# Patient Record
Sex: MALE | Race: WHITE | Employment: OTHER | URBAN - METROPOLITAN AREA
[De-identification: names, ages, dates, MRNs, and addresses within clinical notes are randomized per-mention and may not be internally consistent; named-entity substitution may affect disease eponyms.]

---

## 2018-03-20 ENCOUNTER — HOSPITAL ENCOUNTER (OUTPATIENT)
Dept: CT IMAGING | Age: 78
Discharge: HOME | End: 2018-03-20
Attending: UROLOGY
Payer: MEDICARE

## 2018-03-20 ENCOUNTER — HOSPITAL ENCOUNTER (OUTPATIENT)
Dept: NUCLEAR MEDICINE | Age: 78
Discharge: HOME | End: 2018-03-20
Attending: UROLOGY
Payer: MEDICARE

## 2018-03-20 DIAGNOSIS — C61 PROSTATE CANCER (HCC): ICD-10-CM

## 2018-03-20 PROCEDURE — 74011636320 HC RX REV CODE- 636/320: Performed by: UROLOGY

## 2018-03-20 PROCEDURE — 74177 CT ABD & PELVIS W/CONTRAST: CPT

## 2018-03-20 PROCEDURE — 78306 BONE IMAGING WHOLE BODY: CPT

## 2018-03-20 PROCEDURE — 74011000258 HC RX REV CODE- 258: Performed by: UROLOGY

## 2018-03-20 PROCEDURE — 71260 CT THORAX DX C+: CPT

## 2018-03-20 RX ORDER — SODIUM CHLORIDE 0.9 % (FLUSH) 0.9 %
10 SYRINGE (ML) INJECTION
Status: COMPLETED | OUTPATIENT
Start: 2018-03-20 | End: 2018-03-20

## 2018-03-20 RX ADMIN — SODIUM CHLORIDE 100 ML: 900 INJECTION, SOLUTION INTRAVENOUS at 12:24

## 2018-03-20 RX ADMIN — IOHEXOL 50 ML: 240 INJECTION, SOLUTION INTRATHECAL; INTRAVASCULAR; INTRAVENOUS; ORAL at 12:24

## 2018-03-20 RX ADMIN — IOPAMIDOL 100 ML: 755 INJECTION, SOLUTION INTRAVENOUS at 12:24

## 2018-03-20 RX ADMIN — Medication 10 ML: at 12:24

## 2018-10-11 ENCOUNTER — OFFICE VISIT (OUTPATIENT)
Dept: INTERNAL MEDICINE CLINIC | Age: 78
End: 2018-10-11

## 2018-10-11 ENCOUNTER — HOSPITAL ENCOUNTER (OUTPATIENT)
Dept: LAB | Age: 78
Discharge: HOME | End: 2018-10-11
Payer: MEDICARE

## 2018-10-11 VITALS
DIASTOLIC BLOOD PRESSURE: 90 MMHG | TEMPERATURE: 97.8 F | RESPIRATION RATE: 18 BRPM | HEART RATE: 62 BPM | OXYGEN SATURATION: 98 % | SYSTOLIC BLOOD PRESSURE: 118 MMHG | WEIGHT: 168 LBS | BODY MASS INDEX: 26.37 KG/M2 | HEIGHT: 67 IN

## 2018-10-11 DIAGNOSIS — Z76.89 ENCOUNTER TO ESTABLISH CARE: Primary | ICD-10-CM

## 2018-10-11 DIAGNOSIS — E78.00 ELEVATED LDL CHOLESTEROL LEVEL: ICD-10-CM

## 2018-10-11 DIAGNOSIS — T38.0X5A STEROID-INDUCED HYPERGLYCEMIA: ICD-10-CM

## 2018-10-11 DIAGNOSIS — C79.51 PROSTATE CANCER METASTATIC TO BONE (HCC): ICD-10-CM

## 2018-10-11 DIAGNOSIS — C61 PROSTATE CANCER METASTATIC TO BONE (HCC): ICD-10-CM

## 2018-10-11 DIAGNOSIS — R73.9 STEROID-INDUCED HYPERGLYCEMIA: ICD-10-CM

## 2018-10-11 PROCEDURE — 80061 LIPID PANEL: CPT

## 2018-10-11 PROCEDURE — 36415 COLL VENOUS BLD VENIPUNCTURE: CPT

## 2018-10-11 PROCEDURE — 83036 HEMOGLOBIN GLYCOSYLATED A1C: CPT

## 2018-10-11 RX ORDER — ABIRATERONE ACETATE 500 MG/1
TABLET, FILM COATED ORAL
Refills: 11 | COMMUNITY
Start: 2018-09-19 | End: 2020-08-10 | Stop reason: ALTCHOICE

## 2018-10-11 RX ORDER — PREDNISONE 5 MG/1
TABLET ORAL
Refills: 11 | COMMUNITY
Start: 2018-09-11 | End: 2020-08-10 | Stop reason: ALTCHOICE

## 2018-10-11 NOTE — PROGRESS NOTES
New Patient Evaluation Erica Mahoney is a 78 y.o. male. They are here to establish care with the group and me as a primary care provider. He has not seen a primary care physician in some years until very recently. He has recently had a diagnosis of metastatic prostate CA. He was noted in late Dec to have a PSA of 9 by his previous PCP. He was referred to [facility] (Dr. Ferny Groves) and initially thought to have a prostate infection. He was given antibiotics for a time and then returned for follow up. He then had an elevation of his PSA further to nearly 14. A biopsy proved prostate CA. He was asked to see oncology and did so at [facility]. He was seen by Dr. Serge Walden who eventually diagnosed him with extensive bony metastasis. He was placed on a steroid and Zytiga. The PSA eventually improved. He later noted elevated blood sugars. He was told to come to see a PCP after this was discovered. He has no previous history of diabetes. He has had a colonoscopy 3 years ago. No polyps were noted. He does not remember the name of the doctor. He thinks this was at [facility]. He has had two pneumonia shots, through his past PCP Due for a flu shot Due for shingles shot He has not had an eye doctor recently. He is due an eye exam.  He will go to his wife's doctor. Patient Active Problem List  
 Diagnosis Date Noted  Steroid-induced hyperglycemia 10/11/2018  Elevated LDL cholesterol level 10/11/2018  Prostate cancer metastatic to bone ([facility] [address] 10/11/2018 Current Outpatient Prescriptions Medication Sig Dispense Refill  ZYTIGA 500 mg tab TAKE TWO TABLETS BY MOUTH EVERY DAY FOR 30 DAYS, TAKE ON AN EMPTY STOMACH 1 HOUR BEFORE OR 2 HOURS AFTER EATING. 11  
 predniSONE (DELTASONE) 5 mg tablet TAKE 1 TABLET BY MOUTH DAILY WITH ZYTIGA AS DIRECTED  11  
 leuprolide acetate (LUPRON DEPOT, 3 MONTH, IM) by IntraMUSCular route. No Known Allergies No past medical history on file. History reviewed. No pertinent surgical history. Family History Problem Relation Age of Onset  Breast Cancer Mother  Hypertension Mother  Pulmonary Fibrosis Mother  Other Father   
  charcomavie tooth  Heart Attack Father  Breast Cancer Sister Social History Substance Use Topics  Smoking status: Never Smoker  Smokeless tobacco: Never Used  Alcohol use 3.0 - 4.2 oz/week 5 - 7 Glasses of wine per week Health Maintenance Topic Date Due  
 DTaP/Tdap/Td series (1 - Tdap) 11/07/1963  Shingrix Vaccine Age 50> (1 of 2) 11/07/1992  GLAUCOMA SCREENING Q2Y  11/07/2007  Pneumococcal 65+ High/Highest Risk (1 of 2 - PCV13) 11/07/2007  MEDICARE YEARLY EXAM  03/16/2018  Influenza Age 5 to Adult  08/01/2018 Review of Systems Constitutional: Negative. Respiratory: Negative. Cardiovascular: Negative. Gastrointestinal: Negative. Visit Vitals  /90 (BP 1 Location: Right arm, BP Patient Position: Sitting)  Pulse 62  Temp 97.8 °F (36.6 °C) (Oral)  Resp 18  Ht 5' 7.05\" (1.703 m)  Wt 168 lb (76.2 kg)  SpO2 98%  BMI 26.28 kg/m2 Physical Exam  
Constitutional: No distress. Cardiovascular: Normal rate and regular rhythm. Pulmonary/Chest: Effort normal and breath sounds normal.  
 
 
 
 
ASSESSMENT/PLAN Diagnoses and all orders for this visit: 1. Encounter to establish care 2. Elevated LDL cholesterol level -he has an elevated lipid test from a previous test done in 2014 
-     LIPID PANEL 3. Steroid-induced hyperglycemia -discussed with the patient, we consider medication if necessary. Per oncology, it is imperative that he remain on steroids. 
-     HEMOGLOBIN A1C WITH EAG 4. Prostate cancer metastatic to bone [facility]) -per oncology Follow-up Disposition: 
Return in about 1 month (around 11/11/2018) for Medicare Wellness Visit- 30 minute appointment. -Discussed with the patient to continue the current plan of care. We will obtain baseline labwork and determine if any adjustments need to be done. We will also await the records of the previous PCP to ascertain further details of the patient's history. The patient agrees with and understands the plan of care. All questions have been answered.

## 2018-10-11 NOTE — PATIENT INSTRUCTIONS
Learning About Steroids and High Blood Sugar What are steroids? Steroid medicines (corticosteroids) are often prescribed by doctors to treat many conditions. They help calm down the body's response to inflammation. You may take them for asthma, COPD, back pain, or allergic reactions. They are also used for other conditions such as autoimmune diseases, arthritis, and certain types of cancer. These medicines can be given as pills or injections. The steroids discussed here are not the type of steroids used for body building. What is high blood sugar? Your body turns the food you eat into glucose (sugar), which it uses for energy. That's a good thing. But if your body isn't able to use the sugar right away, it can build up in your blood and cause problems. When the blood sugar rises to a certain level, it's called high blood sugar. This is also known as hyperglycemia. What effect can steroids have on blood sugar? Steroid medicine has many benefits. But one side effect of steroids is that they can raise your blood sugar level while you take them. In most cases, this is temporary. If you already have diabetes, you may notice that your blood sugars jump higher after you take steroids. Very rarely, taking steroids may lead to a new diagnosis of diabetes. What are the symptoms of high blood sugar? The most common symptoms of high blood sugar include: · Thirst. 
· Frequent urination. · Weight loss. · Blurry vision. How is high blood sugar caused by steroids treated? If your doctor thinks your blood sugar might be too high, you will have a blood test. If your blood sugar is at a harmful level, you may need to take medicine that lowers your blood sugar. After you are finished taking steroids, your blood sugar should go back to its usual level. At that point, you won't need the medicine any longer.  
If you are taking medicine for another condition, your doctor may make changes to how you take that medicine. Follow-up care is a key part of your treatment and safety. Be sure to make and go to all appointments, and call your doctor if you are having problems. It's also a good idea to know your test results and keep a list of the medicines you take. Where can you learn more? Go to http://allen-sergey.info/. Enter K280 in the search box to learn more about \"Learning About Steroids and High Blood Sugar. \" Current as of: March 15, 2018 Content Version: 11.8 © 1670-3980 [email or web address]. Care instructions adapted under license by [employer] (which disclaims liability or warranty for this information). If you have questions about a medical condition or this instruction, always ask your healthcare professional. [address] any warranty or liability for your use of this information. Learning About Diabetes Food Guidelines Your Care Instructions Meal planning is important to manage diabetes. It helps keep your blood sugar at a target level (which you set with your doctor). You don't have to eat special foods. You can eat what your family eats, including sweets once in a while. But you do have to pay attention to how often you eat and how much you eat of certain foods. You may want to work with a dietitian or a certified diabetes educator (CDE) to help you plan meals and snacks. A dietitian or CDE can also help you lose weight if that is one of your goals. What should you know about eating carbs? Managing the amount of carbohydrate (carbs) you eat is an important part of healthy meals when you have diabetes. Carbohydrate is found in many foods. · Learn which foods have carbs. And learn the amounts of carbs in different foods. ¨ Bread, cereal, pasta, and rice have about 15 grams of carbs in a serving. A serving is 1 slice of bread (1 ounce), ½ cup of cooked cereal, or 1/3 cup of cooked pasta or rice. ¨ Fruits have 15 grams of carbs in a serving. A serving is 1 small fresh fruit, such as an apple or orange; ½ of a banana; ½ cup of cooked or canned fruit; ½ cup of fruit juice; 1 cup of melon or raspberries; or 2 tablespoons of dried fruit. ¨ Milk and no-sugar-added yogurt have 15 grams of carbs in a serving. A serving is 1 cup of milk or 2/3 cup of no-sugar-added yogurt. ¨ Starchy vegetables have 15 grams of carbs in a serving. A serving is ½ cup of mashed potatoes or sweet potato; 1 cup winter squash; ½ of a small baked potato; ½ cup of cooked beans; or ½ cup cooked corn or green peas. · Learn how much carbs to eat each day and at each meal. A dietitian or CDE can teach you how to keep track of the amount of carbs you eat. This is called carbohydrate counting. · If you are not sure how to count carbohydrate grams, use the Plate Method to plan meals. It is a good, quick way to make sure that you have a balanced meal. It also helps you spread carbs throughout the day. ¨ Divide your plate by types of foods. Put non-starchy vegetables on half the plate, meat or other protein food on one-quarter of the plate, and a grain or starchy vegetable in the final quarter of the plate. To this you can add a small piece of fruit and 1 cup of milk or yogurt, depending on how many carbs you are supposed to eat at a meal. 
· Try to eat about the same amount of carbs at each meal. Do not \"save up\" your daily allowance of carbs to eat at one meal. 
· Proteins have very little or no carbs per serving. Examples of proteins are beef, chicken, turkey, fish, eggs, tofu, cheese, cottage cheese, and peanut butter. A serving size of meat is 3 ounces, which is about the size of a deck of cards. Examples of meat substitute serving sizes (equal to 1 ounce of meat) are 1/4 cup of cottage cheese, 1 egg, 1 tablespoon of peanut butter, and ½ cup of tofu. How can you eat out and still eat healthy? · Learn to estimate the serving sizes of foods that have carbohydrate. If you measure food at home, it will be easier to estimate the amount in a serving of restaurant food. · If the meal you order has too much carbohydrate (such as potatoes, corn, or baked beans), ask to have a low-carbohydrate food instead. Ask for a salad or green vegetables. · If you use insulin, check your blood sugar before and after eating out to help you plan how much to eat in the future. · If you eat more carbohydrate at a meal than you had planned, take a walk or do other exercise. This will help lower your blood sugar. What else should you know? · Limit saturated fat, such as the fat from meat and dairy products. This is a healthy choice because people who have diabetes are at higher risk of heart disease. So choose lean cuts of meat and nonfat or low-fat dairy products. Use olive or canola oil instead of butter or shortening when cooking. · Don't skip meals. Your blood sugar may drop too low if you skip meals and take insulin or certain medicines for diabetes. · Check with your doctor before you drink alcohol. Alcohol can cause your blood sugar to drop too low. Alcohol can also cause a bad reaction if you take certain diabetes medicines. Follow-up care is a key part of your treatment and safety. Be sure to make and go to all appointments, and call your doctor if you are having problems. It's also a good idea to know your test results and keep a list of the medicines you take. Where can you learn more? Go to http://allen-sergey.info/. Enter N058 in the search box to learn more about \"Learning About Diabetes Food Guidelines. \" Current as of: December 7, 2017 Content Version: 11.8 © 5221-4217 Vayusa. Care instructions adapted under license by Vyu (which disclaims liability or warranty for this information).  If you have questions about a medical condition or this instruction, always ask your healthcare professional. [place] [address] any warranty or liability for your use of this information.

## 2018-10-11 NOTE — MR AVS SNAPSHOT
[address] 
[phone] Patient: Chacha Beasley MRN: [id number] JKP:[birth year] Visit Information Date & Time Provider Department Dept. Phone Encounter #  
 10/11/2018  9:30 AM Andre Gomez MD [address] [address] Internal Medicine [phone] [id number] Follow-up Instructions Return in about 1 month (around 11/11/2018) for Medicare Wellness Visit- 30 minute appointment. Upcoming Health Maintenance Date Due DTaP/Tdap/Td series (1 - Tdap) 11/7/1963 Shingrix Vaccine Age 50> (1 of 2) 11/7/1992 GLAUCOMA SCREENING Q2Y 11/7/2007 Pneumococcal 65+ High/Highest Risk (1 of 2 - PCV13) 11/7/2007 MEDICARE YEARLY EXAM 3/16/2018 Influenza Age 5 to Adult 8/1/2018 Allergies as of 10/11/2018  Review Complete On: 10/11/2018 By: Bridger Hopkins No Known Allergies Current Immunizations  Never Reviewed No immunizations on file. Not reviewed this visit You Were Diagnosed With   
  
 Codes Comments Encounter to establish care    -  Primary ICD-10-CM: Z76.89 
ICD-9-CM: V65.8 Elevated LDL cholesterol level     ICD-10-CM: E78.00 ICD-9-CM: 272.0 Steroid-induced hyperglycemia     ICD-10-CM: R73.9, T38.0X5A 
ICD-9-CM: 790.29, E932.0 Vitals BP Pulse Temp Resp Height(growth percentile) Weight(growth percentile)  
 118/90 (BP 1 Location: Right arm, BP Patient Position: Sitting) 62 97.8 °F (36.6 °C) (Oral) 18 5' 7.05\" (1.703 m) 168 lb (76.2 kg) SpO2 BMI Smoking Status 98% 26.28 kg/m2 Never Smoker Vitals History BMI and BSA Data Body Mass Index Body Surface Area  
 26.28 kg/m 2 1.9 m 2 Preferred Pharmacy Pharmacy Name Phone [id number] [place] [address] [phone] Your Updated Medication List  
  
   
This list is accurate as of 10/11/18 10:24 AM.  Always use your most recent med list.  
  
  
  
  
 LUPRON DEPOT (3 MONTH) IM  
by IntraMUSCular route. predniSONE 5 mg tablet Commonly known as:  DELTASONE  
TAKE 1 TABLET BY MOUTH DAILY WITH ZYTIGA AS DIRECTED  
  
 ZYTIGA 500 mg Tab Generic drug:  abiraterone TAKE TWO TABLETS BY MOUTH EVERY DAY FOR 30 DAYS, TAKE ON AN EMPTY STOMACH 1 HOUR BEFORE OR 2 HOURS AFTER EATING. We Performed the Following HEMOGLOBIN A1C WITH EAG [16828 CPT(R)] LIPID PANEL [51325 CPT(R)] Follow-up Instructions Return in about 1 month (around 11/11/2018) for Medicare Wellness Visit- 30 minute appointment. Patient Instructions Learning About Steroids and High Blood Sugar What are steroids? Steroid medicines (corticosteroids) are often prescribed by doctors to treat many conditions. They help calm down the body's response to inflammation. You may take them for asthma, COPD, back pain, or allergic reactions. They are also used for other conditions such as autoimmune diseases, arthritis, and certain types of cancer. These medicines can be given as pills or injections. The steroids discussed here are not the type of steroids used for body building. What is high blood sugar? Your body turns the food you eat into glucose (sugar), which it uses for energy. That's a good thing. But if your body isn't able to use the sugar right away, it can build up in your blood and cause problems. When the blood sugar rises to a certain level, it's called high blood sugar. This is also known as hyperglycemia. What effect can steroids have on blood sugar? Steroid medicine has many benefits. But one side effect of steroids is that they can raise your blood sugar level while you take them. In most cases, this is temporary. If you already have diabetes, you may notice that your blood sugars jump higher after you take steroids. Very rarely, taking steroids may lead to a new diagnosis of diabetes. What are the symptoms of high blood sugar? The most common symptoms of high blood sugar include: · Thirst. 
· Frequent urination. · Weight loss. · Blurry vision. How is high blood sugar caused by steroids treated? If your doctor thinks your blood sugar might be too high, you will have a blood test. If your blood sugar is at a harmful level, you may need to take medicine that lowers your blood sugar. After you are finished taking steroids, your blood sugar should go back to its usual level. At that point, you won't need the medicine any longer. If you are taking medicine for another condition, your doctor may make changes to how you take that medicine. Follow-up care is a key part of your treatment and safety. Be sure to make and go to all appointments, and call your doctor if you are having problems. It's also a good idea to know your test results and keep a list of the medicines you take. Where can you learn more? Go to http://allen-sergey.info/. Enter K280 in the search box to learn more about \"Learning About Steroids and High Blood Sugar. \" Current as of: March 15, 2018 Content Version: 11.8 © 9752-1806 DB Networks. Care instructions adapted under license by [employer] (which disclaims liability or warranty for this information). If you have questions about a medical condition or this instruction, always ask your healthcare professional. [address] any warranty or liability for your use of this information. Learning About Diabetes Food Guidelines Your Care Instructions Meal planning is important to manage diabetes. It helps keep your blood sugar at a target level (which you set with your doctor). You don't have to eat special foods. You can eat what your family eats, including sweets once in a while. But you do have to pay attention to how often you eat and how much you eat of certain foods. You may want to work with a dietitian or a certified diabetes educator (CDE) to help you plan meals and snacks. A dietitian or CDE can also help you lose weight if that is one of your goals. What should you know about eating carbs? Managing the amount of carbohydrate (carbs) you eat is an important part of healthy meals when you have diabetes. Carbohydrate is found in many foods. · Learn which foods have carbs. And learn the amounts of carbs in different foods. ¨ Bread, cereal, pasta, and rice have about 15 grams of carbs in a serving. A serving is 1 slice of bread (1 ounce), ½ cup of cooked cereal, or 1/3 cup of cooked pasta or rice. ¨ Fruits have 15 grams of carbs in a serving. A serving is 1 small fresh fruit, such as an apple or orange; ½ of a banana; ½ cup of cooked or canned fruit; ½ cup of fruit juice; 1 cup of melon or raspberries; or 2 tablespoons of dried fruit. ¨ Milk and no-sugar-added yogurt have 15 grams of carbs in a serving. A serving is 1 cup of milk or 2/3 cup of no-sugar-added yogurt. ¨ Starchy vegetables have 15 grams of carbs in a serving. A serving is ½ cup of mashed potatoes or sweet potato; 1 cup winter squash; ½ of a small baked potato; ½ cup of cooked beans; or ½ cup cooked corn or green peas. · Learn how much carbs to eat each day and at each meal. A dietitian or CDE can teach you how to keep track of the amount of carbs you eat. This is called carbohydrate counting. · If you are not sure how to count carbohydrate grams, use the Plate Method to plan meals. It is a good, quick way to make sure that you have a balanced meal. It also helps you spread carbs throughout the day. ¨ Divide your plate by types of foods. Put non-starchy vegetables on half the plate, meat or other protein food on one-quarter of the plate, and a grain or starchy vegetable in the final quarter of the plate.  To this you can add a small piece of fruit and 1 cup of milk or yogurt, depending on how many carbs you are supposed to eat at a meal. 
· Try to eat about the same amount of carbs at each meal. Do not \"save up\" your daily allowance of carbs to eat at one meal. 
· Proteins have very little or no carbs per serving. Examples of proteins are beef, chicken, turkey, fish, eggs, tofu, cheese, cottage cheese, and peanut butter. A serving size of meat is 3 ounces, which is about the size of a deck of cards. Examples of meat substitute serving sizes (equal to 1 ounce of meat) are 1/4 cup of cottage cheese, 1 egg, 1 tablespoon of peanut butter, and ½ cup of tofu. How can you eat out and still eat healthy? · Learn to estimate the serving sizes of foods that have carbohydrate. If you measure food at home, it will be easier to estimate the amount in a serving of restaurant food. · If the meal you order has too much carbohydrate (such as potatoes, corn, or baked beans), ask to have a low-carbohydrate food instead. Ask for a salad or green vegetables. · If you use insulin, check your blood sugar before and after eating out to help you plan how much to eat in the future. · If you eat more carbohydrate at a meal than you had planned, take a walk or do other exercise. This will help lower your blood sugar. What else should you know? · Limit saturated fat, such as the fat from meat and dairy products. This is a healthy choice because people who have diabetes are at higher risk of heart disease. So choose lean cuts of meat and nonfat or low-fat dairy products. Use olive or canola oil instead of butter or shortening when cooking. · Don't skip meals. Your blood sugar may drop too low if you skip meals and take insulin or certain medicines for diabetes. · Check with your doctor before you drink alcohol. Alcohol can cause your blood sugar to drop too low. Alcohol can also cause a bad reaction if you take certain diabetes medicines. Follow-up care is a key part of your treatment and safety.  Be sure to make and go to all appointments, and call your doctor if you are having problems. It's also a good idea to know your test results and keep a list of the medicines you take. Where can you learn more? Go to http://allen-sergey.info/. Enter O377 in the search box to learn more about \"Learning About Diabetes Food Guidelines. \" Current as of: December 7, 2017 Content Version: 11.8 © 0316-1127 iChange. Care instructions adapted under license by [employer] (which disclaims liability or warranty for this information). If you have questions about a medical condition or this instruction, always ask your healthcare professional. [address] any warranty or liability for your use of this information. Introducing [facility]! [employer] introduces [facility] patient portal. Now you can access parts of your medical record, email your doctor's office, and request medication refills online. 1. In your internet browser, go to [email or web address] 2. Click on the First Time User? Click Here link in the Sign In box. You will see the New Member Sign Up page. 3. Enter your IMVU Access Code exactly as it appears below. You will not need to use this code after youve completed the sign-up process. If you do not sign up before the expiration date, you must request a new code. · IMVU Access Code: [email or web address] Expires: 1/9/2019 10:24 AM 
 
4. Enter the last four digits of your Social Security Number (xxxx) and Date of Birth (mm/dd/yyyy) as indicated and click Submit. You will be taken to the next sign-up page. 5. Create a Nutraspacet ID. This will be your IMVU login ID and cannot be changed, so think of one that is secure and easy to remember. 6. Create a IMVU password. You can change your password at any time. 7. Enter your Password Reset Question and Answer.  This can be used at a later time if you forget your password. 8. Enter your e-mail address. You will receive e-mail notification when new information is available in [address] 9. Click Sign Up. You can now view and download portions of your medical record. 10. Click the Download Summary menu link to download a portable copy of your medical information. If you have questions, please visit the Frequently Asked Questions section of the [facility] website. Remember, [facility] is NOT to be used for urgent needs. For medical emergencies, dial 911. Now available from your iPhone and Android! Please provide this summary of care documentation to your next provider. Your primary care clinician is listed as Emily Hogue. If you have any questions after today's visit, please call [phone].

## 2018-10-12 LAB
CHOLEST SERPL-MCNC: 178 MG/DL (ref 100–199)
EST. AVERAGE GLUCOSE BLD GHB EST-MCNC: 171 MG/DL
HBA1C MFR BLD: 7.6 % (ref 4.8–5.6)
HDLC SERPL-MCNC: 39 MG/DL
LDLC SERPL CALC-MCNC: 112 MG/DL (ref 0–99)
TRIGL SERPL-MCNC: 135 MG/DL (ref 0–149)
VLDLC SERPL CALC-MCNC: 27 MG/DL (ref 5–40)

## 2018-11-12 ENCOUNTER — OFFICE VISIT (OUTPATIENT)
Dept: INTERNAL MEDICINE CLINIC | Age: 78
End: 2018-11-12

## 2018-11-12 VITALS
OXYGEN SATURATION: 97 % | SYSTOLIC BLOOD PRESSURE: 110 MMHG | BODY MASS INDEX: 26.24 KG/M2 | RESPIRATION RATE: 17 BRPM | WEIGHT: 167.2 LBS | TEMPERATURE: 98.4 F | HEIGHT: 67 IN | DIASTOLIC BLOOD PRESSURE: 81 MMHG | HEART RATE: 67 BPM

## 2018-11-12 DIAGNOSIS — Z13.5 SCREENING FOR GLAUCOMA: ICD-10-CM

## 2018-11-12 DIAGNOSIS — Z00.00 MEDICARE ANNUAL WELLNESS VISIT, SUBSEQUENT: Primary | ICD-10-CM

## 2018-11-12 DIAGNOSIS — E11.65 TYPE 2 DIABETES MELLITUS WITH HYPERGLYCEMIA, WITHOUT LONG-TERM CURRENT USE OF INSULIN (HCC): ICD-10-CM

## 2018-11-12 DIAGNOSIS — Z23 ENCOUNTER FOR IMMUNIZATION: ICD-10-CM

## 2018-11-12 RX ORDER — METFORMIN HYDROCHLORIDE 500 MG/1
500 TABLET ORAL 2 TIMES DAILY WITH MEALS
Qty: 60 TAB | Refills: 3 | Status: SHIPPED | OUTPATIENT
Start: 2018-11-12 | End: 2019-03-09 | Stop reason: SDUPTHER

## 2018-11-12 NOTE — PATIENT INSTRUCTIONS
Learning About Diabetes Food Guidelines  Your Care Instructions    Meal planning is important to manage diabetes. It helps keep your blood sugar at a target level (which you set with your doctor). You don't have to eat special foods. You can eat what your family eats, including sweets once in a while. But you do have to pay attention to how often you eat and how much you eat of certain foods. You may want to work with a dietitian or a certified diabetes educator (CDE) to help you plan meals and snacks. A dietitian or CDE can also help you lose weight if that is one of your goals. What should you know about eating carbs? Managing the amount of carbohydrate (carbs) you eat is an important part of healthy meals when you have diabetes. Carbohydrate is found in many foods. · Learn which foods have carbs. And learn the amounts of carbs in different foods. ? Bread, cereal, pasta, and rice have about 15 grams of carbs in a serving. A serving is 1 slice of bread (1 ounce), ½ cup of cooked cereal, or 1/3 cup of cooked pasta or rice. ? Fruits have 15 grams of carbs in a serving. A serving is 1 small fresh fruit, such as an apple or orange; ½ of a banana; ½ cup of cooked or canned fruit; ½ cup of fruit juice; 1 cup of melon or raspberries; or 2 tablespoons of dried fruit. ? Milk and no-sugar-added yogurt have 15 grams of carbs in a serving. A serving is 1 cup of milk or 2/3 cup of no-sugar-added yogurt. ? Starchy vegetables have 15 grams of carbs in a serving. A serving is ½ cup of mashed potatoes or sweet potato; 1 cup winter squash; ½ of a small baked potato; ½ cup of cooked beans; or ½ cup cooked corn or green peas. · Learn how much carbs to eat each day and at each meal. A dietitian or CDE can teach you how to keep track of the amount of carbs you eat. This is called carbohydrate counting. · If you are not sure how to count carbohydrate grams, use the Plate Method to plan meals.  It is a good, quick way to make sure that you have a balanced meal. It also helps you spread carbs throughout the day. ? Divide your plate by types of foods. Put non-starchy vegetables on half the plate, meat or other protein food on one-quarter of the plate, and a grain or starchy vegetable in the final quarter of the plate. To this you can add a small piece of fruit and 1 cup of milk or yogurt, depending on how many carbs you are supposed to eat at a meal.  · Try to eat about the same amount of carbs at each meal. Do not \"save up\" your daily allowance of carbs to eat at one meal.  · Proteins have very little or no carbs per serving. Examples of proteins are beef, chicken, turkey, fish, eggs, tofu, cheese, cottage cheese, and peanut butter. A serving size of meat is 3 ounces, which is about the size of a deck of cards. Examples of meat substitute serving sizes (equal to 1 ounce of meat) are 1/4 cup of cottage cheese, 1 egg, 1 tablespoon of peanut butter, and ½ cup of tofu. How can you eat out and still eat healthy? · Learn to estimate the serving sizes of foods that have carbohydrate. If you measure food at home, it will be easier to estimate the amount in a serving of restaurant food. · If the meal you order has too much carbohydrate (such as potatoes, corn, or baked beans), ask to have a low-carbohydrate food instead. Ask for a salad or green vegetables. · If you use insulin, check your blood sugar before and after eating out to help you plan how much to eat in the future. · If you eat more carbohydrate at a meal than you had planned, take a walk or do other exercise. This will help lower your blood sugar. What else should you know? · Limit saturated fat, such as the fat from meat and dairy products. This is a healthy choice because people who have diabetes are at higher risk of heart disease. So choose lean cuts of meat and nonfat or low-fat dairy products.  Use olive or canola oil instead of butter or shortening when cooking. · Don't skip meals. Your blood sugar may drop too low if you skip meals and take insulin or certain medicines for diabetes. · Check with your doctor before you drink alcohol. Alcohol can cause your blood sugar to drop too low. Alcohol can also cause a bad reaction if you take certain diabetes medicines. Follow-up care is a key part of your treatment and safety. Be sure to make and go to all appointments, and call your doctor if you are having problems. It's also a good idea to know your test results and keep a list of the medicines you take. Where can you learn more? Go to http://allen-sergey.info/. Enter I998 in the search box to learn more about \"Learning About Diabetes Food Guidelines. \"  Current as of: December 7, 2017  Content Version: 11.8  © 7559-5573 [employer]. Care instructions adapted under license by [employer] (which disclaims liability or warranty for this information). If you have questions about a medical condition or this instruction, always ask your healthcare professional. [address] any warranty or liability for your use of this information. Learning About Meal Planning for Diabetes  Why plan your meals? Meal planning can be a key part of managing diabetes. Planning meals and snacks with the right balance of carbohydrate, protein, and fat can help you keep your blood sugar at the target level you set with your doctor. You don't have to eat special foods. You can eat what your family eats, including sweets once in a while. But you do have to pay attention to how often you eat and how much you eat of certain foods. You may want to work with a dietitian or a certified diabetes educator. He or she can give you tips and meal ideas and can answer your questions about meal planning. This health professional can also help you reach a healthy weight if that is one of your goals. What plan is right for you?   Your dietitian or diabetes educator may suggest that you start with the plate format or carbohydrate counting. The plate format  The plate format is a simple way to help you manage how you eat. You plan meals by learning how much space each food should take on a plate. Using the plate format helps you spread carbohydrate throughout the day. It can make it easier to keep your blood sugar level within your target range. It also helps you see if you're eating healthy portion sizes. To use the plate format, you put non-starchy vegetables on half your plate. Add meat or meat substitutes on one-quarter of the plate. Put a grain or starchy vegetable (such as brown rice or a potato) on the final quarter of the plate. You can add a small piece of fruit and some low-fat or fat-free milk or yogurt, depending on your carbohydrate goal for each meal.  Here are some tips for using the plate format:  · Make sure that you are not using an oversized plate. A 9-inch plate is best. Many restaurants use larger plates. · Get used to using the plate format at home. Then you can use it when you eat out. · Write down your questions about using the plate format. Talk to your doctor, a dietitian, or a diabetes educator about your concerns. Carbohydrate counting  With carbohydrate counting, you plan meals based on the amount of carbohydrate in each food. Carbohydrate raises blood sugar higher and more quickly than any other nutrient. It is found in desserts, breads and cereals, and fruit. It's also found in starchy vegetables such as potatoes and corn, grains such as rice and pasta, and milk and yogurt. Spreading carbohydrate throughout the day helps keep your blood sugar levels within your target range. Your daily amount depends on several things, including your weight, how active you are, which diabetes medicines you take, and what your goals are for your blood sugar levels.  A registered dietitian or diabetes educator can help you plan how much carbohydrate to include in each meal and snack. A guideline for your daily amount of carbohydrate is:  · 45 to 60 grams at each meal. That's about the same as 3 to 4 carbohydrate servings. · 15 to 20 grams at each snack. That's about the same as 1 carbohydrate serving. The Nutrition Facts label on packaged foods tells you how much carbohydrate is in a serving of the food. First, look at the serving size on the food label. Is that the amount you eat in a serving? All of the nutrition information on a food label is based on that serving size. So if you eat more or less than that, you'll need to adjust the other numbers. Total carbohydrate is the next thing you need to look for on the label. If you count carbohydrate servings, one serving of carbohydrate is 15 grams. For foods that don't come with labels, such as fresh fruits and vegetables, you'll need a guide that lists carbohydrate in these foods. Ask your doctor, dietitian, or diabetes educator about books or other nutrition guides you can use. If you take insulin, you need to know how many grams of carbohydrate are in a meal. This lets you know how much rapid-acting insulin to take before you eat. If you use an insulin pump, you get a constant rate of insulin during the day. So the pump must be programmed at meals to give you extra insulin to cover the rise in blood sugar after meals. When you know how much carbohydrate you will eat, you can take the right amount of insulin. Or, if you always use the same amount of insulin, you need to make sure that you eat the same amount of carbohydrate at meals. If you need more help to understand carbohydrate counting and food labels, ask your doctor, dietitian, or diabetes educator. How do you get started with meal planning? Here are some tips to get started:  · Plan your meals a week at a time. Don't forget to include snacks too. · Use cookbooks or online recipes to plan several main meals.  Plan some quick meals for busy nights. You also can double some recipes that freeze well. Then you can save half for other busy nights when you don't have time to cook. · Make sure you have the ingredients you need for your recipes. If you're running low on basic items, put these items on your shopping list too. · List foods that you use to make breakfasts, lunches, and snacks. List plenty of fruits and vegetables. · Post this list on the refrigerator. Add to it as you think of more things you need. · Take the list to the store to do your weekly shopping. Follow-up care is a key part of your treatment and safety. Be sure to make and go to all appointments, and call your doctor if you are having problems. It's also a good idea to know your test results and keep a list of the medicines you take. Where can you learn more? Go to http://allenIvan Filmed Entertainmentsergey.info/. Andrew Boone in the search box to learn more about \"Learning About Meal Planning for Diabetes. \"  Current as of: December 7, 2017  Content Version: 11.8  © 9109-1779 [employer]. Care instructions adapted under license by [employer] (which disclaims liability or warranty for this information). If you have questions about a medical condition or this instruction, always ask your healthcare professional. [address] any warranty or liability for your use of this information. Nutrition Tips for Diabetes: After Your Visit  Your Care Instructions  A healthy diet is important to manage diabetes. It helps you lose weight (if you need to) and keep it off. It gives you the nutrition and energy your body needs and helps prevent heart disease. But a diet for diabetes does not mean that you have to eat special foods. You can eat what your family eats, including occasional sweets and other favorites. But you do have to pay attention to how often you eat and how much you eat of certain foods.  The right plan for you will give you meals that help you keep your blood sugar at healthy levels. Try to eat a variety of foods and to spread carbohydrate throughout the day. Carbohydrate raises blood sugar higher and more quickly than any other nutrient does. Carbohydrate is found in sugar, breads and cereals, fruit, starchy vegetables such as potatoes and corn, and milk and yogurt. You may want to work with a dietitian or diabetes educator to help you plan meals and snacks. A dietitian or diabetes educator also can help you lose weight if that is one of your goals. The following tips can help you enjoy your meals and stay healthy. Follow-up care is a key part of your treatment and safety. Be sure to make and go to all appointments, and call your doctor if you are having problems. Its also a good idea to know your test results and keep a list of the medicines you take. How can you care for yourself at home? · Learn which foods have carbohydrate and how much carbohydrate to eat. A dietitian or diabetes educator can help you learn to keep track of how much carbohydrate you eat. · Spread carbohydrate throughout the day. Eat some carbohydrate at all meals, but do not eat too much at any one time. · Plan meals to include food from all the food groups. These are the food groups and some example portion sizes:  ¨ Grains: 1 slice of bread (1 ounce), ½ cup of cooked cereal, and 1/3 cup of cooked pasta or rice. These have about 15 grams of carbohydrate in a serving. Choose whole grains such as whole wheat bread or crackers, oatmeal, and brown rice more often than refined grains. ¨ Fruit: 1 small fresh fruit, such as an apple or orange; ½ of a banana; ½ cup of chopped, cooked, or canned fruit; ½ cup of fruit juice; 1 cup of melon or raspberries; and 2 tablespoons of dried fruit. These have about 15 grams of carbohydrate in a serving. ¨ Dairy: 1 cup of nonfat or low-fat milk and 2/3 cup of plain yogurt.  These have about 15 grams of carbohydrate in a serving. ¨ Protein foods: Beef, chicken, turkey, fish, eggs, tofu, cheese, cottage cheese, and peanut butter. A serving size of meat is 3 ounces, which is about the size of a deck of cards. Examples of meat substitute serving sizes (equal to 1 ounce of meat) are 1/4 cup of cottage cheese, 1 egg, 1 tablespoon of peanut butter, and ½ cup of tofu. These have very little or no carbohydrate per serving. ¨ Vegetables: Starchy vegetables such as ½ cup of cooked dried beans, peas, potatoes, or corn have about 15 grams of carbohydrate. Nonstarchy vegetables have very little carbohydrate, such as 1 cup of raw leafy vegetables (such as spinach), ½ cup of other vegetables (cooked or chopped), and 3/4 cup of vegetable juice. · Use the plate format to plan meals. It is a good, quick way to make sure that you have a balanced meal. It also helps you spread carbohydrate throughout the day. You divide your plate by types of foods. Put vegetables on half the plate, meat or meat substitutes on one-quarter of the plate, and a grain or starchy vegetable (such as brown rice or a potato) in the final quarter of the plate. To this you can add a small piece of fruit and 1 cup of milk or yogurt, depending on how much carbohydrate you are supposed to eat at a meal.  · Talk to your dietitian or diabetes educator about ways to add limited amounts of sweets into your meal plan. You can eat these foods now and then, as long as you include the amount of carbohydrate they have in your daily carbohydrate allowance. · If you drink alcohol, limit it to no more than 1 drink a day for women and 2 drinks a day for men. If you are pregnant, no amount of alcohol is known to be safe. · Protein, fat, and fiber do not raise blood sugar as much as carbohydrate does. If you eat a lot of these nutrients in a meal, your blood sugar will rise more slowly than it would otherwise. · Limit saturated fats, such as those from meat and dairy products.  Try to replace it with monounsaturated fat, such as olive oil. This is a healthier choice because people who have diabetes are at higher-than-average risk of heart disease. But use a modest amount of olive oil. A tablespoon of olive oil has 14 grams of fat and 120 calories. · Exercise lowers blood sugar. If you take insulin by shots or pump, you can use less than you would if you were not exercising. Keep in mind that timing matters. If you exercise within 1 hour after a meal, your body may need less insulin for that meal than it would if you exercised 3 hours after the meal. Test your blood sugar to find out how exercise affects your need for insulin. · Exercise on most days of the week. Aim for at least 30 minutes. Exercise helps you stay at a healthy weight and helps your body use insulin. Walking is an easy way to get exercise. Gradually increase the amount you walk every day. You also may want to swim, bike, or do other activities. When you eat out  · Learn to estimate the serving sizes of foods that have carbohydrate. If you measure food at home, it will be easier to estimate the amount in a serving of restaurant food. · If the meal you order has too much carbohydrate (such as potatoes, corn, or baked beans), ask to have a low-carbohydrate food instead. Ask for a salad or green vegetables. · If you use insulin, check your blood sugar before and after eating out to help you plan how much to eat in the future. · If you eat more carbohydrate at a meal than you had planned, take a walk or do other exercise. This will help lower your blood sugar. Where can you learn more? Go to [employer].be  Enter K604 in the search box to learn more about \"Nutrition Tips for Diabetes: After Your Visit. \"   © 9095-3503 [employer]. Care instructions adapted under license by [employer] (which disclaims liability or warranty for this information).  This care instruction is for use with your licensed healthcare professional. If you have questions about a medical condition or this instruction, always ask your healthcare professional. [place] [address] any warranty or liability for your use of this information.   Content Version: 77.9.999413; Current as of: June 4, 2014

## 2018-11-12 NOTE — PROGRESS NOTES
This is a Subsequent Medicare Annual Wellness Visit providing Personalized Prevention Plan Services (PPPS) (Performed 12 months after initial AWV and PPPS )    I have reviewed the patient's medical history in detail and updated the computerized patient record. Lab work was reviewed with the patient this morning. He has a noted elevation in his hemoglobin A1c to 7.6. This had been previously discussed at his initial visit that he may have diabetes by virtue of his having to be treated with steroids for his metastatic prostate cancer. He reports feeling \"well\". He has no polyuria or polydipsia. He is not excessively hungry and denies lower extremity sensory deficits. We discussed diabetes management in detail. He tells me that 2 days ago he felt bad for 2 hours. He was lethargic, low energy but that this resolved abruptly. He has not had further episodes such as this since that time. Eye appointment set for Dec. 7th    History   History reviewed. No pertinent past medical history. History reviewed. No pertinent surgical history. Current Outpatient Medications   Medication Sig Dispense Refill    diphtheria-pertussis, acellular,-tetanus (BOOSTRIX TDAP) 2.5-8-5 Lf-mcg-Lf/0.5mL susp susp 0.5 mL by IntraMUSCular route once for 1 dose. 0.5 mL 0    varicella-zoster recombinant, PF, (SHINGRIX, PF,) 50 mcg/0.5 mL susr injection 0.5 mL by IntraMUSCular route once for 1 dose. 0.5 mL 0    pneumococcal 13 blayne conj dip (PREVNAR-13) 0.5 mL syrg injection 0.5 mL by IntraMUSCular route once for 1 dose. 0.5 mL 0    ZYTIGA 500 mg tab TAKE TWO TABLETS BY MOUTH EVERY DAY FOR 30 DAYS, TAKE ON AN EMPTY STOMACH 1 HOUR BEFORE OR 2 HOURS AFTER EATING. 11    predniSONE (DELTASONE) 5 mg tablet TAKE 1 TABLET BY MOUTH DAILY WITH ZYTIGA AS DIRECTED  11    leuprolide acetate (LUPRON DEPOT, 3 MONTH, IM) by IntraMUSCular route.        No Known Allergies  Family History   Problem Relation Age of Onset   26 [facility] Breast Cancer Mother  Hypertension Mother     Pulmonary Fibrosis Mother     Other Father         charcomavie tooth    Heart Attack Father     Breast Cancer Sister      Social History     Tobacco Use    Smoking status: Never Smoker    Smokeless tobacco: Never Used   Substance Use Topics    Alcohol use: Yes     Alcohol/week: 3.0 - 4.2 oz     Types: 5 - 7 Glasses of wine per week     Patient Active Problem List   Diagnosis Code    Steroid-induced hyperglycemia R73.9, T38.0X5A    Elevated LDL cholesterol level E78.00    [facility] C61, C79.51       Depression Risk Factor Screening:     PHQ over the last two weeks 10/11/2018   Little interest or pleasure in doing things Not at all   Feeling down, depressed, irritable, or hopeless Not at all   Total Score PHQ 2 0     Alcohol Risk Factor Screening: You do not drink alcohol or very rarely. Functional Ability and Level of Safety:     Hearing Loss   Hearing is good. Activities of Daily Living   Self-care. Requires assistance with: no ADLs    Fall Risk     Fall Risk Assessment, last 12 mths 10/11/2018   Able to walk? Yes   Fall in past 12 months? No     Abuse Screen   Patient is not abused    Review of Systems   Pertinent items are noted in HPI. Physical Examination     Evaluation of Cognitive Function:  Mood/affect:  happy  Appearance: age appropriate  Family member/caregiver input: His wife corroborates the history    Visit Vitals  /81 (BP 1 Location: Right arm, BP Patient Position: Sitting)   Pulse 67   Temp 98.4 °F (36.9 °C) (Oral)   Resp 17   Ht 5' 7.05\" (1.703 m)   Wt 167 lb 3.2 oz (75.8 kg)   SpO2 97%   BMI 26.15 kg/m²     General appearance: alert, cooperative, no distress, appears stated age  Nose: Nares normal. Septum midline. Mucosa normal. No drainage or sinus tenderness.   Throat: Lips, mucosa, and tongue normal. Teeth and gums normal  Lungs: clear to auscultation bilaterally  Heart: regular rate and rhythm, S1, S2 normal, no murmur, click, rub or gallop  Skin: Skin color, texture, turgor normal. No rashes or lesions    Patient Care Team:  Ever Box MD as PCP - General (Internal Medicine)    Advice/Referrals/Counseling   Education and counseling provided:  Are appropriate based on today's review and evaluation      Assessment/Plan   Diagnoses and all orders for this visit:    1. Medicare annual wellness visit, subsequent    2. Encounter for immunization  -     diphtheria-pertussis, acellular,-tetanus (BOOSTRIX TDAP) 2.5-8-5 Lf-mcg-Lf/0.5mL susp susp; 0.5 mL by IntraMUSCular route once for 1 dose.  -     varicella-zoster recombinant, PF, (SHINGRIX, PF,) 50 mcg/0.5 mL susr injection; 0.5 mL by IntraMUSCular route once for 1 dose. 3. Screening for glaucoma    4. Type 2 diabetes mellitus with hyperglycemia, without long-term current use of insulin ([facility] [address] -discussed with the patient, we will begin metformin today. He will monitor his intake of carbohydrates and we will recheck the A1c in 3 months. The patient endorses understanding  -     metFORMIN (GLUCOPHAGE) 500 mg tablet; Take 1 Tab by mouth two (2) times daily (with meals). Follow-up Disposition:  Return in about 3 months (around 2/12/2019). Gil[place] Counter

## 2019-02-12 ENCOUNTER — HOSPITAL ENCOUNTER (OUTPATIENT)
Dept: LAB | Age: 79
Discharge: HOME | End: 2019-02-12
Payer: MEDICARE

## 2019-02-12 DIAGNOSIS — Z12.5 PROSTATE CANCER SCREENING: ICD-10-CM

## 2019-02-12 DIAGNOSIS — Z00.00 WELLNESS EXAMINATION: Primary | ICD-10-CM

## 2019-02-12 PROCEDURE — 85025 COMPLETE CBC W/AUTO DIFF WBC: CPT

## 2019-02-12 PROCEDURE — 36415 COLL VENOUS BLD VENIPUNCTURE: CPT

## 2019-02-12 PROCEDURE — 83036 HEMOGLOBIN GLYCOSYLATED A1C: CPT

## 2019-02-12 PROCEDURE — 80053 COMPREHEN METABOLIC PANEL: CPT

## 2019-02-12 PROCEDURE — 84153 ASSAY OF PSA TOTAL: CPT

## 2019-02-12 PROCEDURE — 82043 UR ALBUMIN QUANTITATIVE: CPT

## 2019-02-13 LAB
ALBUMIN SERPL-MCNC: 4.3 G/DL (ref 3.5–4.8)
ALBUMIN/CREAT UR: <3.1 MG/G CREAT (ref 0–30)
ALBUMIN/GLOB SERPL: 1.9 {RATIO} (ref 1.2–2.2)
ALP SERPL-CCNC: 88 IU/L (ref 39–117)
ALT SERPL-CCNC: 13 IU/L (ref 0–44)
AST SERPL-CCNC: 17 IU/L (ref 0–40)
BASOPHILS # BLD AUTO: 0 X10E3/UL (ref 0–0.2)
BASOPHILS NFR BLD AUTO: 0 %
BILIRUB SERPL-MCNC: 0.6 MG/DL (ref 0–1.2)
BUN SERPL-MCNC: 14 MG/DL (ref 8–27)
BUN/CREAT SERPL: 17 (ref 10–24)
CALCIUM SERPL-MCNC: 9.8 MG/DL (ref 8.6–10.2)
CHLORIDE SERPL-SCNC: 104 MMOL/L (ref 96–106)
CO2 SERPL-SCNC: 24 MMOL/L (ref 20–29)
CREAT SERPL-MCNC: 0.84 MG/DL (ref 0.76–1.27)
CREAT UR-MCNC: 96.7 MG/DL
EOSINOPHIL # BLD AUTO: 0.1 X10E3/UL (ref 0–0.4)
EOSINOPHIL NFR BLD AUTO: 2 %
ERYTHROCYTE [DISTWIDTH] IN BLOOD BY AUTOMATED COUNT: 13.6 % (ref 12.3–15.4)
EST. AVERAGE GLUCOSE BLD GHB EST-MCNC: 131 MG/DL
GLOBULIN SER CALC-MCNC: 2.3 G/DL (ref 1.5–4.5)
GLUCOSE SERPL-MCNC: 106 MG/DL (ref 65–99)
HBA1C MFR BLD: 6.2 % (ref 4.8–5.6)
HCT VFR BLD AUTO: 42.9 % (ref 37.5–51)
HGB BLD-MCNC: 13.8 G/DL (ref 13–17.7)
IMM GRANULOCYTES # BLD AUTO: 0 X10E3/UL (ref 0–0.1)
IMM GRANULOCYTES NFR BLD AUTO: 0 %
LYMPHOCYTES # BLD AUTO: 1.4 X10E3/UL (ref 0.7–3.1)
LYMPHOCYTES NFR BLD AUTO: 27 %
MCH RBC QN AUTO: 29.5 PG (ref 26.6–33)
MCHC RBC AUTO-ENTMCNC: 32.2 G/DL (ref 31.5–35.7)
MCV RBC AUTO: 92 FL (ref 79–97)
MICROALBUMIN UR-MCNC: <3 UG/ML
MONOCYTES # BLD AUTO: 0.4 X10E3/UL (ref 0.1–0.9)
MONOCYTES NFR BLD AUTO: 9 %
NEUTROPHILS # BLD AUTO: 3.1 X10E3/UL (ref 1.4–7)
NEUTROPHILS NFR BLD AUTO: 62 %
PLATELET # BLD AUTO: 181 X10E3/UL (ref 150–379)
POTASSIUM SERPL-SCNC: 4.3 MMOL/L (ref 3.5–5.2)
PROT SERPL-MCNC: 6.6 G/DL (ref 6–8.5)
PSA SERPL-MCNC: <0.1 NG/ML (ref 0–4)
RBC # BLD AUTO: 4.68 X10E6/UL (ref 4.14–5.8)
SODIUM SERPL-SCNC: 145 MMOL/L (ref 134–144)
WBC # BLD AUTO: 5.1 X10E3/UL (ref 3.4–10.8)

## 2019-02-19 ENCOUNTER — OFFICE VISIT (OUTPATIENT)
Dept: INTERNAL MEDICINE CLINIC | Age: 79
End: 2019-02-19

## 2019-02-19 VITALS
SYSTOLIC BLOOD PRESSURE: 119 MMHG | HEART RATE: 74 BPM | WEIGHT: 166.6 LBS | OXYGEN SATURATION: 97 % | BODY MASS INDEX: 26.15 KG/M2 | DIASTOLIC BLOOD PRESSURE: 80 MMHG | HEIGHT: 67 IN | RESPIRATION RATE: 18 BRPM | TEMPERATURE: 98.1 F

## 2019-02-19 DIAGNOSIS — Z23 ENCOUNTER FOR IMMUNIZATION: ICD-10-CM

## 2019-02-19 DIAGNOSIS — T38.0X5A STEROID-INDUCED HYPERGLYCEMIA: Primary | ICD-10-CM

## 2019-02-19 DIAGNOSIS — R73.9 STEROID-INDUCED HYPERGLYCEMIA: Primary | ICD-10-CM

## 2019-02-19 RX ORDER — ACETAMINOPHEN 500 MG
TABLET ORAL 2 TIMES DAILY
COMMUNITY

## 2019-02-19 RX ORDER — CALCIUM CARBONATE 600 MG
600 TABLET ORAL 2 TIMES DAILY
COMMUNITY

## 2019-02-19 NOTE — PROGRESS NOTES
Follow Up Visit    Simon Sheldon is a 70 y.o. male. he presents for Diabetes    Endocrine Review  He is seen for diabetes. Since last visit: he reports doing well on medications. Home glucose monitoring: is not performed. He reports medication compliance: compliant all of the time. He reports the following medication side effects: none. Diabetic diet compliance: compliant all of the time. Further diabetic ROS: no hypoglycemia. Lab review: labs reviewed and discussed with patient, A1c today is 6.2. He is still seeing Oncology. Following up in the coming months. 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 Authorizing Provider   calcium carbonate (CALTREX) 600 mg calcium (1,500 mg) tablet Take 600 mg by mouth two (2) times a day. Yes Provider, Historical   cholecalciferol (VITAMIN D3) 2,000 unit cap capsule Take  by mouth two (2) times a day. Yes Provider, Historical   metFORMIN (GLUCOPHAGE) 500 mg tablet Take 1 Tab by mouth two (2) times daily (with meals). 11/12/18  Yes Cricket Esparza MD   ZYTIGA 500 mg tab TAKE TWO TABLETS BY MOUTH EVERY DAY FOR 30 DAYS, TAKE ON AN EMPTY STOMACH 1 HOUR BEFORE OR 2 HOURS AFTER EATING. 9/19/18  Yes Provider, Historical   predniSONE (DELTASONE) 5 mg tablet TAKE 1 TABLET BY MOUTH DAILY WITH ZYTIGA AS DIRECTED 9/11/18  Yes Provider, Historical   leuprolide acetate (LUPRON DEPOT, 3 MONTH, IM) by IntraMUSCular route.    Yes Provider, Historical         Health Maintenance   Topic Date Due    FOOT EXAM Q1  11/07/1950    EYE EXAM RETINAL OR DILATED  11/07/1952    DTaP/Tdap/Td series (1 - Tdap) 11/07/1963    Shingrix Vaccine Age 50> (1 of 2) 11/07/1992    GLAUCOMA SCREENING Q2Y  11/07/2007    Pneumococcal 65+ High/Highest Risk (1 of 2 - PCV13) 11/07/2007    HEMOGLOBIN A1C Q6M  08/12/2019    LIPID PANEL Q1  10/11/2019    MEDICARE YEARLY EXAM  11/13/2019    MICROALBUMIN Q1  02/12/2020    Influenza Age 9 to Adult  Completed       Review of Systems   Constitutional: Negative. Respiratory: Negative. Cardiovascular: Negative. Visit Vitals  /80 (BP 1 Location: Right arm, BP Patient Position: Sitting)   Pulse 74   Temp 98.1 °F (36.7 °C) (Oral)   Resp 18   Ht 5' 7.05\" (1.703 m)   Wt 166 lb 9.6 oz (75.6 kg)   SpO2 97%   BMI 26.05 kg/m²       Physical Exam   Constitutional: He appears well-developed and well-nourished. Cardiovascular: Normal rate and regular rhythm. Pulmonary/Chest: Effort normal and breath sounds normal.         ASSESSMENT/PLAN    Diagnoses and all orders for this visit:    1. Steroid-induced hyperglycemia - Stabilizing, continue metformin. 2. Encounter for immunization  -     varicella-zoster recombinant, PF, (SHINGRIX, PF,) 50 mcg/0.5 mL susr injection; 0.5 mL by IntraMUSCular route once for 1 dose.  -     diphtheria-pertussis, acellular,-tetanus (BOOSTRIX TDAP) 2.5-8-5 Lf-mcg-Lf/0.5mL susp susp; 0.5 mL by IntraMUSCular route once for 1 dose.         Follow-up Disposition: Not on File

## 2019-03-09 DIAGNOSIS — E11.65 TYPE 2 DIABETES MELLITUS WITH HYPERGLYCEMIA, WITHOUT LONG-TERM CURRENT USE OF INSULIN (HCC): ICD-10-CM

## 2019-03-12 RX ORDER — METFORMIN HYDROCHLORIDE 500 MG/1
TABLET ORAL
Qty: 60 TAB | Refills: 3 | Status: SHIPPED | OUTPATIENT
Start: 2019-03-12 | End: 2019-06-02 | Stop reason: SDUPTHER

## 2019-05-16 ENCOUNTER — TELEPHONE (OUTPATIENT)
Dept: INTERNAL MEDICINE CLINIC | Age: 79
End: 2019-05-16

## 2019-05-18 LAB
INR, EXTERNAL: 1.1
PT, EXTERNAL: 14.1

## 2019-05-20 LAB — CREATININE, EXTERNAL: 0.68

## 2019-05-21 ENCOUNTER — PATIENT OUTREACH (OUTPATIENT)
Dept: INTERNAL MEDICINE CLINIC | Age: 79
End: 2019-05-21

## 2019-05-21 RX ORDER — DOXYCYCLINE 100 MG/1
100 CAPSULE ORAL 2 TIMES DAILY
COMMUNITY
End: 2019-05-24 | Stop reason: SDUPTHER

## 2019-05-21 NOTE — PROGRESS NOTES
19 call placed to patient. No answer. LVM. Will try to call again later. [facility]      Date/Time:  2019 2:54 PM    Patient was admitted to [facility] on 19 and discharged on 19 for Nausea, Vomiting, Fever, Bodyaches. The physician discharge summary was available at the time of outreach. Patient was contacted within 1 business days of discharge. Top Challenges reviewed with the provider   Medical Records from [facility] printed for review. Admission focus on headache and fever    Discharge Summary recommended repeat labs. [facility] appointment 19 patient aware and will be fasting. Awaiting tick borne illness lab results. Patient to be notified of concerning results. Started on doxycycline 100 mg BID       Method of communication with provider :chart routing    Inpatient RRAT score: not available  Was this a readmission? no   Patient stated reason for the readmission: n/a    Nurse Navigator (NN) contacted the patient by telephone to perform post hospital discharge assessment. Verified name and  with patient as identifiers. Provided introduction to self, and explanation of the Nurse Navigator role. Reviewed discharge instructions and red flags with patient who verbalized understanding. Patient given an opportunity to ask questions and does not have any further questions or concerns at this time. The patient agrees to contact the PCP office for questions related to their healthcare. NN provided contact information for future reference. Disease Specific:   N/A    Summary of patient's top problems:  1. Patient sent to ED after call to PCP with report of fever, N/V, body aches. Admitted to [facility] following treatment of 15 mg Toradol, 975 mg tylenol, zofran and stress dose hydrocotrisone 100 mg. Significant improvement in symptoms reported, although persistent head ache.  Multitude of tests performed and general consensus appeared symptoms are related to virus. Tick borne illness test results remain outstanding. Home Health orders at discharge: [address]: none  Date of initial visit: n/a    Durable Medical Equipment ordered/company: none  Durable Medical Equipment received: none    Barriers to care? lack of knowledge about disease    Advance Care Planning:   Does patient have an Advance Directive:  not on file     Medication(s):   New Medications at Discharge: doxycycline  Changed Medications at Discharge: none  Discontinued Medications at Discharge: none    Medication reconciliation was performed with patient, who verbalizes understanding of administration of home medications. There were no barriers to obtaining medications identified at this time. Referral to Pharm D needed: no     Current Outpatient Medications   Medication Sig    metFORMIN (GLUCOPHAGE) 500 mg tablet TAKE 1 TABLET BY MOUTH TWICE A DAY WITH MEALS    calcium carbonate (CALTREX) 600 mg calcium (1,500 mg) tablet Take 600 mg by mouth two (2) times a day.  cholecalciferol (VITAMIN D3) 2,000 unit cap capsule Take  by mouth two (2) times a day.  ZYTIGA 500 mg tab TAKE TWO TABLETS BY MOUTH EVERY DAY FOR 30 DAYS, TAKE ON AN EMPTY STOMACH 1 HOUR BEFORE OR 2 HOURS AFTER EATING.  predniSONE (DELTASONE) 5 mg tablet TAKE 1 TABLET BY MOUTH DAILY WITH ZYTIGA AS DIRECTED    leuprolide acetate (LUPRON DEPOT, 3 MONTH, IM) by IntraMUSCular route. No current facility-administered medications for this visit. There are no discontinued medications. [facility] follow up appointment(s):   Future Appointments   Date Time Provider Abhi Doyle   5/24/2019  9:00 AM Christopher Brooks MD [id number] [facility]   6/18/2019  9:00 AM Christopher Brooks MD [id number] [facility]      Non-[facility] follow up appointment(s): none  [facility]:  will supply patient with information at office visit 5/24/19       Goals      Prevent complications post hospitalization. 5/21/19  -Patient will be report any new symptoms or changes in condition to PCP immediately  -Patient will notify PCP if any tick borne illness labs return with positive results.   [facility] will notify patient if there are any concerning results.  -Patient will take doxycyline as directed until medication is completed  -Patient will follow up with PCP for [facility] appointment on 5/24/19  -Patient will follow up with oncologist 6/5/19 10:30 am  -NN supplied contact information and will follow up in 1 week  Brooklynn Eaton RN

## 2019-05-24 ENCOUNTER — DOCUMENTATION ONLY (OUTPATIENT)
Dept: INTERNAL MEDICINE CLINIC | Age: 79
End: 2019-05-24

## 2019-05-24 ENCOUNTER — PATIENT OUTREACH (OUTPATIENT)
Dept: INTERNAL MEDICINE CLINIC | Age: 79
End: 2019-05-24

## 2019-05-24 ENCOUNTER — OFFICE VISIT (OUTPATIENT)
Dept: INTERNAL MEDICINE CLINIC | Age: 79
End: 2019-05-24

## 2019-05-24 VITALS
BODY MASS INDEX: 25.65 KG/M2 | RESPIRATION RATE: 18 BRPM | WEIGHT: 163.4 LBS | DIASTOLIC BLOOD PRESSURE: 80 MMHG | HEIGHT: 67 IN | SYSTOLIC BLOOD PRESSURE: 122 MMHG | OXYGEN SATURATION: 97 % | TEMPERATURE: 98.1 F | HEART RATE: 57 BPM

## 2019-05-24 DIAGNOSIS — B34.9 VIRAL ILLNESS: Primary | ICD-10-CM

## 2019-05-24 DIAGNOSIS — C61 PROSTATE CANCER METASTATIC TO BONE (HCC): ICD-10-CM

## 2019-05-24 DIAGNOSIS — C79.51 PROSTATE CANCER METASTATIC TO BONE (HCC): ICD-10-CM

## 2019-05-24 RX ORDER — DOXYCYCLINE 100 MG/1
CAPSULE ORAL
Refills: 0 | COMMUNITY
Start: 2019-05-20 | End: 2020-08-10 | Stop reason: ALTCHOICE

## 2019-05-24 NOTE — ACP (ADVANCE CARE PLANNING)
Non-Provider Advance Care Planning (ACP) Note    Date of ACP Conversation: 5/24/2019  Persons included in Conversation: patient  Length of ACP Conversation in minutes: <16 minutes (Non-Billable)    Conversation requested by:   Nurse Navigator    Authorized Decision Maker (if patient is incapable of making informed decisions): This person is:   Other Legally Authorized Decision Maker (e.g. Next of Kin)        General ACP for ALL Patients with Decision Making Capacity:    Advance Directive Conversation with Patients who have not yet planned:  Importance of advance care planning, including choosing a healthcare agent to communicate patient's healthcare decisions if patient lost the ability to make decisions, such as after a sudden illness or accident  Recommended additional conversation with prospective agent    Review of Existing Advance Directive: (Select questions covered)  n/a    Interventions Provided:  Provided ACP educational materials:  Conversation Starter Kit  Recommended completion of Advance Directive after review of materials, conversation with prospective healthcare agent about (and others as needed), and readiness to complete a written plan

## 2019-05-24 NOTE — ACP (ADVANCE CARE PLANNING)
====Alexis Rodriguez Invitation====    Patient was invited to [facility] on this date and given the information folder for review. [facility] facilitator for ACP conversation regarding advance directives. [x] Yes  [] No  Referral sent to [facility] Choices team member or Coordinator for follow-up    [] Yes  [x] No  Patient scheduled an appointment.        [facility]

## 2019-05-24 NOTE — PROGRESS NOTES
Goals      Prevent complications post hospitalization. 5/24/19   -Patient in office today for [facility] appointment. Met with patient and wife for introduction to self. -Honoring Choices packet provided. Email sent to facilitator for follow up   -Patient reports started feeling better yesterday and feels ok this morning. -NN will follow up with patient next week as planned. Radha Montalvo RN     5/21/19  -Patient will be report any new symptoms or changes in condition to PCP immediately  -Patient will notify PCP if any tick borne illness labs return with positive results.   [facility] will notify patient if there are any concerning results.  -Patient will take doxycyline as directed until medication is completed  -Patient will follow up with PCP for [facility] appointment on 5/24/19  -Patient will follow up with oncologist 6/5/19 10:30 am  -NN supplied contact information and will follow up in 1 week  Radha Montalvo RN

## 2019-05-30 ENCOUNTER — PATIENT OUTREACH (OUTPATIENT)
Dept: INTERNAL MEDICINE CLINIC | Age: 79
End: 2019-05-30

## 2019-05-30 NOTE — PROGRESS NOTES
Goals      Prevent complications post hospitalization. 5/30/19  - 2:52  Patient returned call. - Patient reports doing well. No significant headaches or changes in condition since last office visit  -Patient reports he has not heard from [facility] regarding tick born illness labs. -Patient offered chance for questions or concerns. None at this time. -NN will follow up with patient in 1-2 weeks  Rose Kirk RN    - 2:45  Call placed to patient. No answer. LVM. Will try again later. ST    5/24/19   -Patient in office today for [facility] appointment. Met with patient and wife for introduction to self. -Honoring Choices packet provided. Email sent to facilitator for follow up   -Patient reports started feeling better yesterday and feels ok this morning. -NN will follow up with patient next week as planned. Rose Kirk RN     5/21/19  -Patient will be report any new symptoms or changes in condition to PCP immediately  -Patient will notify PCP if any tick borne illness labs return with positive results.   [facility] will notify patient if there are any concerning results.  -Patient will take doxycyline as directed until medication is completed  -Patient will follow up with PCP for [facility] appointment on 5/24/19  -Patient will follow up with oncologist 6/5/19 10:30 am  -NN supplied contact information and will follow up in 1 week  Rose Kirk RN

## 2019-06-02 DIAGNOSIS — E11.65 TYPE 2 DIABETES MELLITUS WITH HYPERGLYCEMIA, WITHOUT LONG-TERM CURRENT USE OF INSULIN (HCC): ICD-10-CM

## 2019-06-02 RX ORDER — METFORMIN HYDROCHLORIDE 500 MG/1
TABLET ORAL
Qty: 60 TAB | Refills: 2 | Status: SHIPPED | OUTPATIENT
Start: 2019-06-02 | End: 2019-09-03 | Stop reason: SDUPTHER

## 2019-06-07 ENCOUNTER — PATIENT OUTREACH (OUTPATIENT)
Dept: INTERNAL MEDICINE CLINIC | Age: 79
End: 2019-06-07

## 2019-06-07 NOTE — PROGRESS NOTES
Goals      Prevent complications post hospitalization. 6/7/19 Call placed to pt. No answer. LVM. Will try again next week. Rich Goldstein RN    5/30/19  - 2:52  Patient returned call. - Patient reports doing well. No significant headaches or changes in condition since last office visit  -Patient reports he has not heard from [facility] regarding tick born illness labs. -Patient offered chance for questions or concerns. None at this time. -NN will follow up with patient in 1-2 weeks  Rich Goldstein RN    - 2:45  Call placed to patient. No answer. LVM. Will try again later. ST    5/24/19   -Patient in office today for [facility] appointment. Met with patient and wife for introduction to self. -Honoring Choices packet provided. Email sent to facilitator for follow up   -Patient reports started feeling better yesterday and feels ok this morning. -NN will follow up with patient next week as planned. Rich Goldstein RN     5/21/19  -Patient will be report any new symptoms or changes in condition to PCP immediately  -Patient will notify PCP if any tick borne illness labs return with positive results.   [facility] will notify patient if there are any concerning results.  -Patient will take doxycyline as directed until medication is completed  -Patient will follow up with PCP for [facility] appointment on 5/24/19  -Patient will follow up with oncologist 6/5/19 10:30 am  -NN supplied contact information and will follow up in 1 week  Rich Goldstein RN

## 2019-06-17 ENCOUNTER — PATIENT OUTREACH (OUTPATIENT)
Dept: INTERNAL MEDICINE CLINIC | Age: 79
End: 2019-06-17

## 2019-06-17 NOTE — PROGRESS NOTES
Goals      Prevent complications post hospitalization. 6/17/19 Call placed to pt. No answer. LVM. Will try again later this week. ST    6/7/19 Call placed to pt. No answer. LVM. Will try again next week. Roberto Walsh RN    5/30/19  - 2:52  Patient returned call. - Patient reports doing well. No significant headaches or changes in condition since last office visit  -Patient reports he has not heard from [address] [place] [address] regarding tick born illness labs. -Patient offered chance for questions or concerns. None at this time. -NN will follow up with patient in 1-2 weeks  Roberto Walsh RN    - 2:45  Call placed to patient. No answer. LVM. Will try again later. ST    5/24/19   -Patient in office today for [facility] appointment. Met with patient and wife for introduction to self. -Honoring Choices packet provided. Email sent to facilitator for follow up   -Patient reports started feeling better yesterday and feels ok this morning. -NN will follow up with patient next week as planned. Roberto Walsh RN     5/21/19  -Patient will be report any new symptoms or changes in condition to PCP immediately  -Patient will notify PCP if any tick borne illness labs return with positive results.   [facility] will notify patient if there are any concerning results.  -Patient will take doxycyline as directed until medication is completed  -Patient will follow up with PCP for [facility] appointment on 5/24/19  -Patient will follow up with oncologist 6/5/19 10:30 am  -NN supplied contact information and will follow up in 1 week  Roberto Walsh RN

## 2019-06-24 ENCOUNTER — PATIENT OUTREACH (OUTPATIENT)
Dept: INTERNAL MEDICINE CLINIC | Age: 79
End: 2019-06-24

## 2019-07-02 NOTE — PROGRESS NOTES
Follow Up Visit    Jacob Haro is a 70 y.o. male. he presents for ED Follow-up    The patient comes for follow-up after having been sent to the ED for fever nausea vomiting and body aches. He has a history of metastatic prostate cancer for which he is currently being treated. It was on this basis that he was advised to go to the he went to the [facility] emergency room and there was evaluate by infectious disease and internal medicine. The general consensus of the time was that he likely had a viral illness. He was discharged on doxycycline which have been begun in the hospital out of concern for tickborne illness. His testing has thus been negative for any evidence of Lyme or other tickborne disorder. Today he feels better and although slightly weak is improving. Patient Active Problem List   Diagnosis Code    Steroid-induced hyperglycemia R73.9, T38.0X5A    Elevated LDL cholesterol level E78.00    Prostate cancer metastatic to bone ([facility] C61, C79.51         Prior to Admission medications    Medication Sig Start Date End Date Taking? Authorizing Provider   doxycycline (VIBRAMYCIN) 100 mg capsule TAKE ONE CAPSULE BY MOUTH EVERY 12 HOURS 5/20/19  Yes Provider, Historical   calcium carbonate (CALTREX) 600 mg calcium (1,500 mg) tablet Take 600 mg by mouth two (2) times a day. Yes Provider, Historical   cholecalciferol (VITAMIN D3) 2,000 unit cap capsule Take  by mouth two (2) times a day. Yes Provider, Historical   ZYTIGA 500 mg tab TAKE TWO TABLETS BY MOUTH EVERY DAY FOR 30 DAYS, TAKE ON AN EMPTY STOMACH 1 HOUR BEFORE OR 2 HOURS AFTER EATING. 9/19/18  Yes Provider, Historical   predniSONE (DELTASONE) 5 mg tablet TAKE 1 TABLET BY MOUTH DAILY WITH ZYTIGA AS DIRECTED 9/11/18  Yes Provider, Historical   leuprolide acetate (LUPRON DEPOT, 3 MONTH, IM) by IntraMUSCular route.    Yes Provider, Historical   metFORMIN (GLUCOPHAGE) 500 mg tablet TAKE 1 TABLET BY MOUTH TWICE A DAY WITH MEALS 6/2/19   Alver Heimlich, Rosalie Redd MD         Health Maintenance   Topic Date Due    FOOT EXAM Q1  11/07/1952    DTaP/Tdap/Td series (1 - Tdap) 11/07/1963    Shingrix Vaccine Age 50> (1 of 2) 11/07/1992    Pneumococcal 65+ years (1 of 2 - PCV13) 11/07/2007    Influenza Age 9 to Adult  08/01/2019    HEMOGLOBIN A1C Q6M  08/12/2019    LIPID PANEL Q1  10/11/2019    MEDICARE YEARLY EXAM  11/13/2019    MICROALBUMIN Q1  02/12/2020    GLAUCOMA SCREENING Q2Y  12/07/2020    EYE EXAM RETINAL OR DILATED  12/07/2020       Review of Systems   Constitutional: Positive for malaise/fatigue. Respiratory: Negative. Cardiovascular: Negative. Gastrointestinal: Negative. Genitourinary: Negative. Visit Vitals  /80 (BP 1 Location: Right arm, BP Patient Position: Sitting)   Pulse (!) 57   Temp 98.1 °F (36.7 °C) (Oral)   Resp 18   Ht 5' 7.05\" (1.703 m)   Wt 163 lb 6.4 oz (74.1 kg)   SpO2 97%   BMI 25.55 kg/m²       Physical Exam   Constitutional: He appears well-developed and well-nourished. Cardiovascular: Normal rate and regular rhythm. Pulmonary/Chest: Effort normal and breath sounds normal.         ASSESSMENT/PLAN    Diagnoses and all orders for this visit:    1. Viral illness - Advised the patient to continue his current supportive care. Virus appears to have resolved. 2. Prostate cancer metastatic to bone [facility])      Follow-up and Dispositions    · Return in about 3 months (around 8/24/2019) for Follow up.

## 2020-01-09 DIAGNOSIS — E11.65 TYPE 2 DIABETES MELLITUS WITH HYPERGLYCEMIA, WITHOUT LONG-TERM CURRENT USE OF INSULIN (HCC): ICD-10-CM

## 2020-01-09 RX ORDER — METFORMIN HYDROCHLORIDE 500 MG/1
TABLET ORAL
Qty: 120 TAB | Refills: 0 | Status: SHIPPED | OUTPATIENT
Start: 2020-01-09 | End: 2020-05-19

## 2020-05-16 DIAGNOSIS — E11.65 TYPE 2 DIABETES MELLITUS WITH HYPERGLYCEMIA, WITHOUT LONG-TERM CURRENT USE OF INSULIN (HCC): ICD-10-CM

## 2020-05-19 RX ORDER — METFORMIN HYDROCHLORIDE 500 MG/1
TABLET ORAL
Qty: 120 TAB | Refills: 0 | Status: SHIPPED | OUTPATIENT
Start: 2020-05-19 | End: 2020-07-14

## 2020-07-10 DIAGNOSIS — E11.65 TYPE 2 DIABETES MELLITUS WITH HYPERGLYCEMIA, WITHOUT LONG-TERM CURRENT USE OF INSULIN (HCC): ICD-10-CM

## 2020-07-14 ENCOUNTER — TELEPHONE (OUTPATIENT)
Dept: INTERNAL MEDICINE CLINIC | Age: 80
End: 2020-07-14

## 2020-07-14 RX ORDER — METFORMIN HYDROCHLORIDE 500 MG/1
TABLET ORAL
Qty: 60 TAB | Refills: 0 | Status: SHIPPED | OUTPATIENT
Start: 2020-07-14 | End: 2020-08-12

## 2020-08-07 ENCOUNTER — HOSPITAL ENCOUNTER (OUTPATIENT)
Dept: LAB | Age: 80
Discharge: HOME | End: 2020-08-07
Payer: MEDICARE

## 2020-08-07 PROCEDURE — 80053 COMPREHEN METABOLIC PANEL: CPT

## 2020-08-07 PROCEDURE — 83036 HEMOGLOBIN GLYCOSYLATED A1C: CPT

## 2020-08-07 PROCEDURE — 85025 COMPLETE CBC W/AUTO DIFF WBC: CPT

## 2020-08-07 PROCEDURE — 80061 LIPID PANEL: CPT

## 2020-08-08 LAB
ALBUMIN SERPL-MCNC: 4.2 G/DL (ref 3.7–4.7)
ALBUMIN/GLOB SERPL: 1.8 {RATIO} (ref 1.2–2.2)
ALP SERPL-CCNC: 136 IU/L (ref 39–117)
ALT SERPL-CCNC: 11 IU/L (ref 0–44)
AST SERPL-CCNC: 16 IU/L (ref 0–40)
BASOPHILS # BLD AUTO: 0 X10E3/UL (ref 0–0.2)
BASOPHILS NFR BLD AUTO: 1 %
BILIRUB SERPL-MCNC: 0.3 MG/DL (ref 0–1.2)
BUN SERPL-MCNC: 18 MG/DL (ref 8–27)
BUN/CREAT SERPL: 25 (ref 10–24)
CALCIUM SERPL-MCNC: 9.1 MG/DL (ref 8.6–10.2)
CHLORIDE SERPL-SCNC: 107 MMOL/L (ref 96–106)
CHOLEST SERPL-MCNC: 161 MG/DL (ref 100–199)
CO2 SERPL-SCNC: 23 MMOL/L (ref 20–29)
CREAT SERPL-MCNC: 0.72 MG/DL (ref 0.76–1.27)
EOSINOPHIL # BLD AUTO: 0.4 X10E3/UL (ref 0–0.4)
EOSINOPHIL NFR BLD AUTO: 11 %
ERYTHROCYTE [DISTWIDTH] IN BLOOD BY AUTOMATED COUNT: 13.4 % (ref 11.6–15.4)
EST. AVERAGE GLUCOSE BLD GHB EST-MCNC: 131 MG/DL
GLOBULIN SER CALC-MCNC: 2.4 G/DL (ref 1.5–4.5)
GLUCOSE SERPL-MCNC: 125 MG/DL (ref 65–99)
HBA1C MFR BLD: 6.2 % (ref 4.8–5.6)
HCT VFR BLD AUTO: 37.6 % (ref 37.5–51)
HDLC SERPL-MCNC: 38 MG/DL
HGB BLD-MCNC: 12.6 G/DL (ref 13–17.7)
IMM GRANULOCYTES # BLD AUTO: 0 X10E3/UL (ref 0–0.1)
IMM GRANULOCYTES NFR BLD AUTO: 1 %
LDLC SERPL CALC-MCNC: 86 MG/DL (ref 0–99)
LYMPHOCYTES # BLD AUTO: 0.5 X10E3/UL (ref 0.7–3.1)
LYMPHOCYTES NFR BLD AUTO: 13 %
MCH RBC QN AUTO: 29.4 PG (ref 26.6–33)
MCHC RBC AUTO-ENTMCNC: 33.5 G/DL (ref 31.5–35.7)
MCV RBC AUTO: 88 FL (ref 79–97)
MONOCYTES # BLD AUTO: 0.4 X10E3/UL (ref 0.1–0.9)
MONOCYTES NFR BLD AUTO: 10 %
NEUTROPHILS # BLD AUTO: 2.6 X10E3/UL (ref 1.4–7)
NEUTROPHILS NFR BLD AUTO: 64 %
PLATELET # BLD AUTO: 214 X10E3/UL (ref 150–450)
POTASSIUM SERPL-SCNC: 4.8 MMOL/L (ref 3.5–5.2)
PROT SERPL-MCNC: 6.6 G/DL (ref 6–8.5)
RBC # BLD AUTO: 4.29 X10E6/UL (ref 4.14–5.8)
SODIUM SERPL-SCNC: 145 MMOL/L (ref 134–144)
TRIGL SERPL-MCNC: 186 MG/DL (ref 0–149)
VLDLC SERPL CALC-MCNC: 37 MG/DL (ref 5–40)
WBC # BLD AUTO: 4 X10E3/UL (ref 3.4–10.8)

## 2020-08-10 ENCOUNTER — OFFICE VISIT (OUTPATIENT)
Dept: INTERNAL MEDICINE CLINIC | Age: 80
End: 2020-08-10
Payer: MEDICARE

## 2020-08-10 ENCOUNTER — HOSPITAL ENCOUNTER (OUTPATIENT)
Dept: LAB | Age: 80
Discharge: HOME | End: 2020-08-10
Payer: MEDICARE

## 2020-08-10 VITALS
BODY MASS INDEX: 26.09 KG/M2 | HEART RATE: 81 BPM | OXYGEN SATURATION: 98 % | TEMPERATURE: 98.2 F | HEIGHT: 67 IN | WEIGHT: 166.2 LBS | DIASTOLIC BLOOD PRESSURE: 80 MMHG | SYSTOLIC BLOOD PRESSURE: 138 MMHG | RESPIRATION RATE: 14 BRPM

## 2020-08-10 DIAGNOSIS — C61 PROSTATE CANCER METASTATIC TO BONE (HCC): ICD-10-CM

## 2020-08-10 DIAGNOSIS — E11.65 TYPE 2 DIABETES MELLITUS WITH HYPERGLYCEMIA, WITHOUT LONG-TERM CURRENT USE OF INSULIN (HCC): ICD-10-CM

## 2020-08-10 DIAGNOSIS — C79.51 PROSTATE CANCER METASTATIC TO BONE (HCC): ICD-10-CM

## 2020-08-10 DIAGNOSIS — Z00.00 MEDICARE ANNUAL WELLNESS VISIT, SUBSEQUENT: Primary | ICD-10-CM

## 2020-08-10 PROCEDURE — G8510 SCR DEP NEG, NO PLAN REQD: HCPCS | Performed by: INTERNAL MEDICINE

## 2020-08-10 PROCEDURE — G8536 NO DOC ELDER MAL SCRN: HCPCS | Performed by: INTERNAL MEDICINE

## 2020-08-10 PROCEDURE — G0439 PPPS, SUBSEQ VISIT: HCPCS | Performed by: INTERNAL MEDICINE

## 2020-08-10 PROCEDURE — 1101F PT FALLS ASSESS-DOCD LE1/YR: CPT | Performed by: INTERNAL MEDICINE

## 2020-08-10 PROCEDURE — 82043 UR ALBUMIN QUANTITATIVE: CPT

## 2020-08-10 PROCEDURE — G8427 DOCREV CUR MEDS BY ELIG CLIN: HCPCS | Performed by: INTERNAL MEDICINE

## 2020-08-10 PROCEDURE — G8419 CALC BMI OUT NRM PARAM NOF/U: HCPCS | Performed by: INTERNAL MEDICINE

## 2020-08-10 PROCEDURE — G0444 DEPRESSION SCREEN ANNUAL: HCPCS | Performed by: INTERNAL MEDICINE

## 2020-08-10 NOTE — PATIENT INSTRUCTIONS
Medicare Wellness Visit, Male The best way to live healthy is to have a lifestyle where you eat a well-balanced diet, exercise regularly, limit alcohol use, and quit all forms of tobacco/nicotine, if applicable. Regular preventive services are another way to keep healthy. Preventive services (vaccines, screening tests, monitoring & exams) can help personalize your care plan, which helps you manage your own care. Screening tests can find health problems at the earliest stages, when they are easiest to treat. [facility] follows the current, evidence-based guidelines published by the [facility] ([facility]) when recommending preventive services for our patients. Because we follow these guidelines, sometimes recommendations change over time as research supports it. (For example, a prostate screening blood test is no longer routinely recommended for men with no symptoms). Of course, you and your doctor may decide to screen more often for some diseases, based on your risk and co-morbidities (chronic disease you are already diagnosed with). Preventive services for you include: - Medicare offers their members a free annual wellness visit, which is time for you and your primary care provider to discuss and plan for your preventive service needs. Take advantage of this benefit every year! 
-All adults over age 72 should receive the recommended pneumonia vaccines. Current USPSTF guidelines recommend a series of two vaccines for the best pneumonia protection.  
-All adults should have a flu vaccine yearly and tetanus vaccine every 10 years. 
-All adults age 48 and older should receive the shingles vaccines (series of two vaccines).       
-All adults age 38-68 who are overweight should have a diabetes screening test once every three years.  
-Other screening tests & preventive services for persons with diabetes include: an eye exam to screen for diabetic retinopathy, a kidney function test, a foot exam, and stricter control over your cholesterol.  
-Cardiovascular screening for adults with routine risk involves an electrocardiogram (ECG) at intervals determined by the provider.  
-Colorectal cancer screening should be done for adults age 54-65 with no increased risk factors for colorectal cancer. There are a number of acceptable methods of screening for this type of cancer. Each test has its own benefits and drawbacks. Discuss with your provider what is most appropriate for you during your annual wellness visit. The different tests include: colonoscopy (considered the best screening method), a fecal occult blood test, a fecal DNA test, and sigmoidoscopy. 
-All adults born between [facility] should be screened once for Hepatitis C. 
-An Abdominal Aortic Aneurysm (AAA) Screening is recommended for men age 73-68 who has ever smoked in their lifetime. Here is a list of your current Health Maintenance items (your personalized list of preventive services) with a due date: 
Health Maintenance Due Topic Date Due  
 Diabetic Foot Care  11/07/1950  DTaP/Tdap/Td  (1 - Tdap) 11/07/1963  Shingles Vaccine (1 of 2) 11/07/1992  Pneumococcal Vaccine (1 of 1 - PPSV23) 11/07/2007  Pneumococcal Vaccine 65+ years at Risk (1 of 2 - PCV13) 11/07/2007 Jori Annual Well Visit  11/13/2019  Albumin Urine Test  02/12/2020  Flu Vaccine  08/01/2020

## 2020-08-10 NOTE — PROGRESS NOTES
Chief Complaint   Patient presents with   Maricel Webster Annual Wellness Visit     1. Have you been to the ER, urgent care clinic since your last visit? Hospitalized since your last visit? No    2. Have you seen or consulted any other health care providers outside of the [address] [place] [address] since your last visit? Include any pap smears or colon screening.  Yes Where: Dr Tommy Bunn/Oncology Reason for visit: [facility]-metastatic prostate cancer

## 2020-08-11 LAB
ALBUMIN/CREAT UR: 5 MG/G CREAT (ref 0–29)
CREAT UR-MCNC: 123.5 MG/DL
MICROALBUMIN UR-MCNC: 5.8 UG/ML

## 2020-08-14 ENCOUNTER — HOSPITAL ENCOUNTER (OUTPATIENT)
Dept: GENERAL RADIOLOGY | Age: 80
Discharge: HOME | End: 2020-08-14
Attending: INTERNAL MEDICINE
Payer: MEDICARE

## 2020-08-14 ENCOUNTER — OFFICE VISIT (OUTPATIENT)
Dept: INTERNAL MEDICINE CLINIC | Age: 80
End: 2020-08-14
Attending: INTERNAL MEDICINE
Payer: MEDICARE

## 2020-08-14 VITALS
OXYGEN SATURATION: 98 % | RESPIRATION RATE: 18 BRPM | BODY MASS INDEX: 26.24 KG/M2 | DIASTOLIC BLOOD PRESSURE: 75 MMHG | HEIGHT: 67 IN | HEART RATE: 70 BPM | SYSTOLIC BLOOD PRESSURE: 128 MMHG | WEIGHT: 167.2 LBS | TEMPERATURE: 98 F

## 2020-08-14 DIAGNOSIS — R10.10 UPPER ABDOMINAL PAIN: ICD-10-CM

## 2020-08-14 DIAGNOSIS — C61 PROSTATE CANCER METASTATIC TO BONE (HCC): ICD-10-CM

## 2020-08-14 DIAGNOSIS — C79.51 PROSTATE CANCER METASTATIC TO BONE (HCC): ICD-10-CM

## 2020-08-14 DIAGNOSIS — R10.10 UPPER ABDOMINAL PAIN: Primary | ICD-10-CM

## 2020-08-14 DIAGNOSIS — E11.65 TYPE 2 DIABETES MELLITUS WITH HYPERGLYCEMIA, WITHOUT LONG-TERM CURRENT USE OF INSULIN (HCC): ICD-10-CM

## 2020-08-14 PROCEDURE — G8510 SCR DEP NEG, NO PLAN REQD: HCPCS | Performed by: INTERNAL MEDICINE

## 2020-08-14 PROCEDURE — 74018 RADEX ABDOMEN 1 VIEW: CPT

## 2020-08-14 PROCEDURE — 99214 OFFICE O/P EST MOD 30 MIN: CPT | Performed by: INTERNAL MEDICINE

## 2020-08-14 PROCEDURE — 1101F PT FALLS ASSESS-DOCD LE1/YR: CPT | Performed by: INTERNAL MEDICINE

## 2020-08-14 PROCEDURE — G0463 HOSPITAL OUTPT CLINIC VISIT: HCPCS | Performed by: INTERNAL MEDICINE

## 2020-08-14 PROCEDURE — G8536 NO DOC ELDER MAL SCRN: HCPCS | Performed by: INTERNAL MEDICINE

## 2020-08-14 PROCEDURE — G8419 CALC BMI OUT NRM PARAM NOF/U: HCPCS | Performed by: INTERNAL MEDICINE

## 2020-08-14 PROCEDURE — G8427 DOCREV CUR MEDS BY ELIG CLIN: HCPCS | Performed by: INTERNAL MEDICINE

## 2020-08-14 NOTE — PROGRESS NOTES
HIPAA verified by two patient identifiers. Christopher Higgins is a 70 y.o. male    Chief Complaint   Patient presents with    Abdominal Pain       Visit Vitals  /75 (BP 1 Location: Left arm, BP Patient Position: Sitting)   Pulse 70   Temp 98 °F (36.7 °C) (Temporal)   Resp 18   Ht 5' 7.05\" (1.703 m)   Wt 167 lb 3.2 oz (75.8 kg)   SpO2 98%   BMI 26.15 kg/m²       Pain Scale: 2/10  Pain Location: Abdomen      Health Maintenance Due   Topic Date Due    Foot Exam Q1  11/07/1952    DTaP/Tdap/Td series (1 - Tdap) 11/07/1963    Shingrix Vaccine Age 50> (1 of 2) 11/07/1992    Pneumococcal 65+ years (1 of 1 - PPSV23) 11/07/2007    Pneumococcal 65+ yrs at Risk Vaccine (1 of 2 - PCV13) 11/07/2007    MICROALBUMIN Q1  02/12/2020    Influenza Age 9 to Adult  08/01/2020         Coordination of Care Questionnaire:  :   1) Have you been to an emergency room, urgent care, or hospitalized since your last visit? If yes, where when, and reason for visit? no       2. Have seen or consulted any other health care provider since your last visit? If yes, where when, and reason for visit? NO      Patient is accompanied by self I have received verbal consent from Christopher Higgins to discuss any/all medical information while they are present in the room.

## 2020-08-14 NOTE — PROGRESS NOTES
Acute Care Note    Clare Galvez is 70 y.o. male. he presents for evaluation of Abdominal Pain    Abdominal Pain  Daniella Wang is here to talk about abdominal pain. This is a new problem and symptoms began 7 days ago and have worsened. Pain is located in upper abdomen and described as aching and tender to palpation  . His symptoms are aggravated by movement and recumbency. Symptoms improve with lying still. He has tried tylenol. Past history includes metastatic prostate cancer. Previous studies include colonoscopy which was normal.  Of note, his chemotheraputics for prostate cancer do have abdominal pain listed as a side effect. He has not yet discussed this with his oncologist.        Prior to Admission medications    Medication Sig Start Date End Date Taking? Authorizing Provider   metFORMIN (GLUCOPHAGE) 500 mg tablet TAKE 1 TABLET BY MOUTH TWICE A DAY WITH MEALS 8/12/20  Yes Osmar Varner MD   enzalutamide Jose Antonio Kay) 40 mg capsule 160 mg = 4 CAP each dose, PO, daily, do not break, chew, or open capsules. ICD 10: C61.0. CRPC, # 120 CAP, 11 Refills, Pharmacy: [address] [place] [address] 7/22/20 7/17/21 Yes Provider, Historical   calcium carbonate (CALTREX) 600 mg calcium (1,500 mg) tablet Take 600 mg by mouth two (2) times a day. Yes Provider, Historical   cholecalciferol (VITAMIN D3) 2,000 unit cap capsule Take  by mouth two (2) times a day. Yes Provider, Historical   leuprolide acetate (LUPRON DEPOT, 3 MONTH, IM) by IntraMUSCular route. Yes Provider, Historical         Patient Active Problem List   Diagnosis Code    Steroid-induced hyperglycemia R73.9, T38.0X5A    Elevated LDL cholesterol level E78.00    Prostate cancer metastatic to bone ([facility] [address] C61, C79.51         Review of Systems   Constitutional: Negative. Respiratory: Negative. Cardiovascular: Negative. Genitourinary: Negative.           Visit Vitals  /75 (BP 1 Location: Left arm, BP Patient Position: Sitting) Pulse 70   Temp 98 °F (36.7 °C) (Temporal)   Resp 18   Ht 5' 7.05\" (1.703 m)   Wt 167 lb 3.2 oz (75.8 kg)   SpO2 98%   BMI 26.15 kg/m²       Physical Exam  Constitutional:       Appearance: Normal appearance. Cardiovascular:      Rate and Rhythm: Normal rate and regular rhythm. Heart sounds: Normal heart sounds. Abdominal:      Palpations: Abdomen is soft. Tenderness: There is abdominal tenderness (mildly tender to palpation at the bilateral flanks and epigastric area). Neurological:      Mental Status: He is alert. ASSESSMENT/PLAN  Diagnoses and all orders for this visit:    1. Upper abdominal pain - Unclear etiology for the pain. Possible constipation, we will ask the pt's oncologist to weigh in.   -     XR ABD (KUB); Future    2. Type 2 diabetes mellitus with hyperglycemia, without long-term current use of insulin (HCC)    3. Prostate cancer metastatic to bone [facility])         Advised the patient to call back or return to office if symptoms worsen/change/persist.   Discussed expected course/resolution/complications of diagnosis in detail with patient. Medication risks/benefits/costs/interactions/alternatives discussed with patient. The patient was given an after visit summary which includes diagnoses, current medications, & vitals. They expressed understanding with the diagnosis and plan.

## 2020-08-17 ENCOUNTER — TELEPHONE (OUTPATIENT)
Dept: INTERNAL MEDICINE CLINIC | Age: 80
End: 2020-08-17

## 2020-08-17 NOTE — TELEPHONE ENCOUNTER
Patient states sensitive below breast bone, upper stomach area. Worsening. Xray done. Was recommended suppositories and Miralax. Had BM, yesterday better. Still having severe pain. Would like to know next steps in treatment.

## 2020-08-18 NOTE — TELEPHONE ENCOUNTER
Patient provided oncologist, Dr America Diaz with [facility] at [facility] [phone].

## 2021-01-18 ENCOUNTER — VIRTUAL VISIT (OUTPATIENT)
Dept: INTERNAL MEDICINE CLINIC | Age: 81
End: 2021-01-18
Payer: MEDICARE

## 2021-01-18 ENCOUNTER — TELEPHONE (OUTPATIENT)
Dept: INTERNAL MEDICINE CLINIC | Age: 81
End: 2021-01-18

## 2021-01-18 DIAGNOSIS — C79.51 PROSTATE CANCER METASTATIC TO BONE (HCC): Primary | ICD-10-CM

## 2021-01-18 DIAGNOSIS — C61 PROSTATE CANCER METASTATIC TO BONE (HCC): Primary | ICD-10-CM

## 2021-01-18 DIAGNOSIS — R19.7 DIARRHEA, UNSPECIFIED TYPE: ICD-10-CM

## 2021-01-18 DIAGNOSIS — G44.039 NONINTRACTABLE PAROXYSMAL HEMICRANIA, UNSPECIFIED CHRONICITY PATTERN: ICD-10-CM

## 2021-01-18 PROCEDURE — G8432 DEP SCR NOT DOC, RNG: HCPCS | Performed by: INTERNAL MEDICINE

## 2021-01-18 PROCEDURE — G8428 CUR MEDS NOT DOCUMENT: HCPCS | Performed by: INTERNAL MEDICINE

## 2021-01-18 PROCEDURE — G0463 HOSPITAL OUTPT CLINIC VISIT: HCPCS | Performed by: INTERNAL MEDICINE

## 2021-01-18 PROCEDURE — 99214 OFFICE O/P EST MOD 30 MIN: CPT | Performed by: INTERNAL MEDICINE

## 2021-01-18 PROCEDURE — 1101F PT FALLS ASSESS-DOCD LE1/YR: CPT | Performed by: INTERNAL MEDICINE

## 2021-01-26 NOTE — PROGRESS NOTES
Robert Shultz is a 68 y.o. male who was seen by synchronous (real-time) audio-video technology on 1/18/2021. He confirmed that, for purposes of billing, this is a virtual visit with his provider for which we will submit a claim for reimbursement to his insurance company. He is aware that he will be responsible for any copays, coinsurance amounts or other amounts not covered by his insurance company. Do you accept - YES    This visit was completed was completed virtually using [employer] and technology        Subjective:   Robert Shultz was seen for Headache (and loose stools x 5 days. cancer patient )      The patient reports several days of headache. He notes that this has been off and on but mainly present in recent days. He has also been eating poorly due to a poor appetite. He is currently under treatment with urology for metastatic prostate CA. He is being given chemotherapy and radiation. We discussed how these therapies are likely contributing to his present symptoms. He also has been somewhat dehydrated due to the frequency of loose stools. He will discuss this concerns with his oncologist.      Prior to Admission medications    Medication Sig Start Date End Date Taking? Authorizing Provider   metFORMIN (GLUCOPHAGE) 500 mg tablet TAKE 1 TABLET BY MOUTH TWICE A DAY WITH MEALS 11/5/20  Yes Shashank Hickman MD   cholecalciferol (VITAMIN D3) 2,000 unit cap capsule Take  by mouth two (2) times a day. Yes Provider, Historical   leuprolide acetate (LUPRON DEPOT, 3 MONTH, IM) by IntraMUSCular route. Yes Provider, Historical   calcium carbonate (CALTREX) 600 mg calcium (1,500 mg) tablet Take 600 mg by mouth two (2) times a day.     Provider, Historical       No Known Allergies    Patient Active Problem List    Diagnosis Date Noted    Steroid-induced hyperglycemia 10/11/2018    Elevated LDL cholesterol level 10/11/2018    Prostate cancer metastatic to bone ([facility] [address] 10/11/2018     Current Outpatient Medications Medication Sig Dispense Refill    metFORMIN (GLUCOPHAGE) 500 mg tablet TAKE 1 TABLET BY MOUTH TWICE A DAY WITH MEALS 180 Tab 1    cholecalciferol (VITAMIN D3) 2,000 unit cap capsule Take  by mouth two (2) times a day.  leuprolide acetate (LUPRON DEPOT, 3 MONTH, IM) by IntraMUSCular route.  calcium carbonate (CALTREX) 600 mg calcium (1,500 mg) tablet Take 600 mg by mouth two (2) times a day. No Known Allergies  No past medical history on file. No past surgical history on file. Family History   Problem Relation Age of Onset    Breast Cancer Mother     Hypertension Mother     Pulmonary Fibrosis Mother     Other Father         charcomavie tooth    Heart Attack Father     Breast Cancer Sister      Social History     Tobacco Use    Smoking status: Never Smoker    Smokeless tobacco: Never Used   Substance Use Topics    Alcohol use: Yes     Alcohol/week: 5.0 - 7.0 standard drinks     Types: 5 - 7 Glasses of wine per week          ROS - per HPI      Objective:     General: alert, cooperative, no distress   Mental  status: pe mental status_general use: normal mood, behavior, speech, dress, motor activity, and thought processes, able to follow commands   Eyes: EOM intact, normal sclera   Mouth: mucous membranes moist   Neck: no visualized mass   Resp: PULM - obs findings: normal effort and no respiratory distress   Neuro: neuro - obs: no gross deficits   Musculoskeletal: normal ROM of neck and normal gait w/o ataxia   Skin: skin exam: no discoloration or lesions of concern on visible areas   Psychiatric: normal affect, no hallucinations           Assessment & Plan:   Diagnoses and all orders for this visit:    1. Prostate cancer metastatic to bone ([facility] [address]    2. Nonintractable paroxysmal hemicrania, unspecified chronicity pattern    3. Diarrhea, unspecified type    Symptoms appear due to chemotherapy. Follow-up and Dispositions    · Return if symptoms worsen or fail to improve.              Due to this being a TeleHealth evaluation, many elements of the physical examination are unable to be assessed. Pursuant to the emergency declaration under the [address] [place] [address] waiver authority and the [employer] Act, this Virtual  Visit was conducted, with patient's consent, to reduce the patient's risk of exposure to COVID-19 and provide continuity of care for an established patient. Services were provided through a video synchronous discussion virtually to substitute for in-person clinic visit. We discussed the expected course, resolution and complications of the diagnosis(es) in detail. Medication risks, benefits, costs, interactions, and alternatives were discussed as indicated. I advised him to contact the office if his condition worsens, changes or fails to improve as anticipated. He expressed understanding with the diagnosis(es) and plan.      Jeaneth Jennings MD

## 2021-04-22 DIAGNOSIS — E11.65 TYPE 2 DIABETES MELLITUS WITH HYPERGLYCEMIA, WITHOUT LONG-TERM CURRENT USE OF INSULIN (HCC): ICD-10-CM

## 2021-04-23 DIAGNOSIS — E11.65 TYPE 2 DIABETES MELLITUS WITH HYPERGLYCEMIA, WITHOUT LONG-TERM CURRENT USE OF INSULIN (HCC): ICD-10-CM

## 2021-04-23 RX ORDER — METFORMIN HYDROCHLORIDE 500 MG/1
TABLET ORAL
Qty: 180 TAB | Refills: 1 | Status: SHIPPED | OUTPATIENT
Start: 2021-04-23 | End: 2021-04-23 | Stop reason: SDUPTHER

## 2021-04-23 RX ORDER — METFORMIN HYDROCHLORIDE 500 MG/1
500 TABLET ORAL 2 TIMES DAILY WITH MEALS
Qty: 180 TAB | Refills: 1 | Status: SHIPPED | OUTPATIENT
Start: 2021-04-23

## 2023-08-10 NOTE — PROGRESS NOTES
Problem: Impaired Physical Mobility  Goal: # Bed mobility, ambulation, and ADLs are maintained or returned to baseline during hospitalization  Outcome: Outcome Met, Continue evaluating goal progress toward completion     Problem: Activity Intolerance  Goal: # Functional status is maintained or returned to baseline  Outcome: Outcome Met, Continue evaluating goal progress toward completion  Goal: # Tolerates activity for d/c setting with no clinical problems  Outcome: Outcome Met, Continue evaluating goal progress toward completion     Problem: At Risk for Falls  Goal: # Patient does not fall  Outcome: Outcome Met, Continue evaluating goal progress toward completion  Goal: # Takes action to control fall-related risks  Outcome: Outcome Met, Continue evaluating goal progress toward completion  Goal: # Verbalizes understanding of fall risk/precautions  Description: Document education using the patient education activity  Outcome: Outcome Not Met, Continue to Monitor      This is the Subsequent Medicare Annual Wellness Exam, performed 12 months or more after the Initial AWV or the last Subsequent AWV    I have reviewed the patient's medical history in detail and updated the computerized patient record. Metastatic prostate Ca. On [place] is following with urology closely. Noted improved alk phos from his prior testing (has a history of bony metastasis. He has a history of diabetes as well. Stable on metformin. A1c 6.2    History     Patient Active Problem List   Diagnosis Code    Steroid-induced hyperglycemia R73.9, T38.0X5A    Elevated LDL cholesterol level E78.00    Prostate cancer metastatic to bone ([facility] [address] C61, C79.51     History reviewed. No pertinent past medical history. History reviewed. No pertinent surgical history. Current Outpatient Medications   Medication Sig Dispense Refill    enzalutamide (XTANDI) 40 mg capsule 160 mg = 4 CAP each dose, PO, daily, do not break, chew, or open capsules. ICD 10: C61.0. CRPC, # 120 CAP, 11 Refills, Pharmacy: [facility]      metFORMIN (GLUCOPHAGE) 500 mg tablet TAKE 1 TABLET BY MOUTH TWICE A DAY WITH MEALS 60 Tab 0    calcium carbonate (CALTREX) 600 mg calcium (1,500 mg) tablet Take 600 mg by mouth two (2) times a day.  cholecalciferol (VITAMIN D3) 2,000 unit cap capsule Take  by mouth two (2) times a day.  leuprolide acetate (LUPRON DEPOT, 3 MONTH, IM) by IntraMUSCular route. No Known Allergies    Family History   Problem Relation Age of Onset    Breast Cancer Mother     Hypertension Mother     Pulmonary Fibrosis Mother     Other Father         charcomavie tooth    Heart Attack Father     Breast Cancer Sister      Social History     Tobacco Use    Smoking status: Never Smoker    Smokeless tobacco: Never Used   Substance Use Topics    Alcohol use:  Yes     Alcohol/week: 5.0 - 7.0 standard drinks     Types: 5 - 7 Glasses of wine per week       Depression Risk Factor Screening:     3 most recent PHQ Screens 8/10/2020   Little interest or pleasure in doing things Not at all   Feeling down, depressed, irritable, or hopeless Not at all   Total Score PHQ 2 0       Alcohol Risk Factor Screening (MALE > 65): Do you average more 1 drink per night or more than 7 drinks a week: No    In the past three months have you have had more than 4 drinks containing alcohol on one occasion: No      Functional Ability and Level of Safety:   Hearing: Hearing is good. Activities of Daily Living: The home contains: no safety equipment. Patient does total self care     Ambulation: with no difficulty     Fall Risk:  Fall Risk Assessment, last 12 mths 8/10/2020   Able to walk? Yes   Fall in past 12 months? No     Abuse Screen:  Patient is not abused       Cognitive Screening   Has your family/caregiver stated any concerns about your memory: no         Patient Care Team   Patient Care Team:  Carmen Garcia MD as PCP - General (Internal Medicine)  Carmen Garcia MD as PCP - [facility] Provider    Assessment/Plan   Education and counseling provided:  Are appropriate based on today's review and evaluation    Diagnoses and all orders for this visit:    1. Medicare annual wellness visit, subsequent    2. Type 2 diabetes mellitus with hyperglycemia, without long-term current use of insulin (HCC)  -     MICROALBUMIN, UR, RAND W/ MICROALB/CREAT RATIO    3.  Prostate cancer metastatic to bone [facility])        Health Maintenance Due   Topic Date Due    Foot Exam Q1  11/07/1950    DTaP/Tdap/Td series (1 - Tdap) 11/07/1963    Shingrix Vaccine Age 50> (1 of 2) 11/07/1992    Pneumococcal 65+ years (1 of 1 - PPSV23) 11/07/2007    Pneumococcal 65+ yrs at Risk Vaccine (1 of 2 - PCV13) 11/07/2007    Medicare Yearly Exam  11/13/2019    MICROALBUMIN Q1  02/12/2020    Influenza Age 9 to Adult  08/01/2020